# Patient Record
Sex: FEMALE | Race: WHITE | Employment: PART TIME | URBAN - METROPOLITAN AREA
[De-identification: names, ages, dates, MRNs, and addresses within clinical notes are randomized per-mention and may not be internally consistent; named-entity substitution may affect disease eponyms.]

---

## 2018-03-12 ENCOUNTER — TELEPHONE (OUTPATIENT)
Dept: FAMILY MEDICINE CLINIC | Facility: CLINIC | Age: 48
End: 2018-03-12

## 2018-03-12 ENCOUNTER — OFFICE VISIT (OUTPATIENT)
Dept: FAMILY MEDICINE CLINIC | Facility: CLINIC | Age: 48
End: 2018-03-12

## 2018-03-12 VITALS
SYSTOLIC BLOOD PRESSURE: 102 MMHG | HEIGHT: 62 IN | WEIGHT: 122 LBS | TEMPERATURE: 98 F | BODY MASS INDEX: 22.45 KG/M2 | RESPIRATION RATE: 16 BRPM | HEART RATE: 78 BPM | DIASTOLIC BLOOD PRESSURE: 68 MMHG | OXYGEN SATURATION: 99 %

## 2018-03-12 DIAGNOSIS — R07.89 ANTERIOR CHEST WALL PAIN: Primary | ICD-10-CM

## 2018-03-12 DIAGNOSIS — R07.89 COSTOCHONDRAL CHEST PAIN: ICD-10-CM

## 2018-03-12 PROCEDURE — 99213 OFFICE O/P EST LOW 20 MIN: CPT | Performed by: PHYSICIAN ASSISTANT

## 2018-03-12 NOTE — PROGRESS NOTES
CC:  Patient presents with:  Chest Pain      HPI: Elaina Presumalicia presents for complaints of anterior chest wall pain just left of the sternum since yesterday.  She accidentally inhaled a sip of water and the coughing fit that followed caused a sharp pin in her an atraumatic  Ears: Normal external ears  Nose: No bleeding or rhinorrhea  Eyes: Pupils equal  Cardio: RRR; no murmurs noted  Pulmonary/Chest: Lungs CTA  Abdomen: No distention  Musculoskeletal:  There is TTP over the sternum and anterior ribs on the left si

## 2018-03-12 NOTE — TELEPHONE ENCOUNTER
C/o Lt sided chest pain started yesterday pain with Deep breath no pain with movement painful with palpation appointment made with Mary Cadena PA-c for today

## 2018-10-05 ENCOUNTER — HOSPITAL ENCOUNTER (OUTPATIENT)
Dept: GENERAL RADIOLOGY | Facility: HOSPITAL | Age: 48
Discharge: HOME | End: 2018-10-05
Attending: INTERNAL MEDICINE
Payer: COMMERCIAL

## 2018-10-05 DIAGNOSIS — R13.12 OROPHARYNGEAL DYSPHAGIA: ICD-10-CM

## 2018-10-05 PROCEDURE — 74246 X-RAY XM UPR GI TRC 2CNTRST: CPT | Performed by: INTERNAL MEDICINE

## 2018-10-15 ENCOUNTER — HOSPITAL ENCOUNTER (OUTPATIENT)
Dept: CT IMAGING | Facility: HOSPITAL | Age: 48
Discharge: HOME | End: 2018-10-15
Attending: INTERNAL MEDICINE
Payer: COMMERCIAL

## 2018-10-15 DIAGNOSIS — R93.3 ABNORMAL ESOPHAGRAM: ICD-10-CM

## 2018-10-15 PROCEDURE — 70491 CT SOFT TISSUE NECK W/DYE: CPT | Performed by: INTERNAL MEDICINE

## 2018-10-15 PROCEDURE — 82565 ASSAY OF CREATININE: CPT

## 2018-11-08 PROBLEM — R12 HEARTBURN: Status: ACTIVE | Noted: 2018-11-08

## 2018-11-08 PROBLEM — Z12.11 SPECIAL SCREENING FOR MALIGNANT NEOPLASM OF COLON: Status: ACTIVE | Noted: 2018-11-08

## 2018-11-08 PROBLEM — R13.10 DYSPHAGIA: Status: ACTIVE | Noted: 2018-11-08

## 2018-11-29 LAB
AMB EXT CHOL/HDL RATIO: 2.5
AMB EXT CHOLESTEROL, TOTAL: 226 MG/DL
AMB EXT CREATININE: 0.6 MG/DL
AMB EXT GLUCOSE: 85 MG/DL
AMB EXT HDL CHOLESTEROL: 92 MG/DL
AMB EXT HEMATOCRIT: 37.6
AMB EXT HEMOGLOBIN: 12.5
AMB EXT HGBA1C: 5.4 %
AMB EXT LDL CHOLESTEROL, DIRECT: 124 MG/DL
AMB EXT MCV: 91
AMB EXT NON HDL CHOL: 134 MG/DL
AMB EXT PLATELETS: 219
AMB EXT TRIGLYCERIDES: 50 MG/DL
AMB EXT TSH: 1.04 MIU/ML
AMB EXT WBC: 6.3 X10(3)UL

## 2019-06-05 ENCOUNTER — OFFICE VISIT (OUTPATIENT)
Dept: INTERNAL MEDICINE CLINIC | Facility: CLINIC | Age: 49
End: 2019-06-05
Payer: COMMERCIAL

## 2019-06-05 VITALS
WEIGHT: 128.81 LBS | RESPIRATION RATE: 14 BRPM | OXYGEN SATURATION: 97 % | HEIGHT: 62 IN | SYSTOLIC BLOOD PRESSURE: 120 MMHG | HEART RATE: 77 BPM | BODY MASS INDEX: 23.7 KG/M2 | TEMPERATURE: 98 F | DIASTOLIC BLOOD PRESSURE: 72 MMHG

## 2019-06-05 DIAGNOSIS — Z13.89 SCREENING FOR GENITOURINARY CONDITION: ICD-10-CM

## 2019-06-05 DIAGNOSIS — Z13.228 SCREENING FOR METABOLIC DISORDER: ICD-10-CM

## 2019-06-05 DIAGNOSIS — Z13.0 SCREENING FOR IRON DEFICIENCY ANEMIA: ICD-10-CM

## 2019-06-05 DIAGNOSIS — Z13.1 SCREENING FOR DIABETES MELLITUS: ICD-10-CM

## 2019-06-05 DIAGNOSIS — E55.9 VITAMIN D DEFICIENCY: ICD-10-CM

## 2019-06-05 DIAGNOSIS — Z13.220 SCREENING FOR LIPOID DISORDERS: ICD-10-CM

## 2019-06-05 DIAGNOSIS — Z12.39 SCREENING FOR BREAST CANCER: ICD-10-CM

## 2019-06-05 DIAGNOSIS — Z00.00 ENCOUNTER FOR ANNUAL PHYSICAL EXAM: Primary | ICD-10-CM

## 2019-06-05 DIAGNOSIS — Z13.29 SCREENING FOR THYROID DISORDER: ICD-10-CM

## 2019-06-05 DIAGNOSIS — R13.12 OROPHARYNGEAL DYSPHAGIA: ICD-10-CM

## 2019-06-05 PROCEDURE — 99386 PREV VISIT NEW AGE 40-64: CPT | Performed by: STUDENT IN AN ORGANIZED HEALTH CARE EDUCATION/TRAINING PROGRAM

## 2019-06-05 PROCEDURE — 99202 OFFICE O/P NEW SF 15 MIN: CPT | Performed by: STUDENT IN AN ORGANIZED HEALTH CARE EDUCATION/TRAINING PROGRAM

## 2019-06-05 NOTE — PROGRESS NOTES
[facility]    REASON FOR VISIT:    Rukhsana Beavers is a 44year old female who presents for an [address]. This is a new patient to me.    Patient's medications, allergies, past medical, surgical, social and family histories wer 04/14/2001    Chlamydia Screening Screen Annually age<25, if sex active/on OCPs; >24 high risk No results found for: CHLAMYDIA    Colonoscopy Screen Every 10 years Colonoscopy due on 11/08/2028    Flex Sigmoidoscopy Screen  Every 5 years No results found f ENDOSCOPY,EXAM  01/01/2007      Family History   Problem Relation Age of Onset   • Alcohol and Other Disorders Associated Father    • Hypertension Father    • Ovarian Cancer Mother    • Other (Other) Mother         cervical cancer      SOCIAL HISTORY:   So and atraumatic. Nose: Nose normal.   Throat: posterior oropharyngeal erythema present. Eyes: EOM are normal. Pupils are equal, round, and reactive to light. No scleral icterus. Neck: Normal range of motion. No thyromegaly present.    Cardiovascular: No weekly. Health maintenance: will check fasting Lipids, CMP, CBC, TSH, UA and Vit D. Vaccinations: Tdap utd. FLU vaccination in the fall. Mammogram: due now. Ordered  PAP and breast exam: utd. Pt is utd with screening colonoscopy.   Pt info handouts give mammogram     Patient/Caregiver Education: There are no barriers to learning. Medical education done. Educated by: MD   The patient indicates understanding of these issues and agrees to the plan.     Diet counseling perfomed  Exercise counseling perfome

## 2019-06-05 NOTE — PATIENT INSTRUCTIONS
Prevention Guidelines, Women Ages 48 to 59  Screening tests and vaccines are an important part of managing your health. A screening test is done to find possible disorders or diseases in people who don't have any symptoms.  The goal is to find a disea Chlamydia Women at increased risk for infection At routine exams   Colorectal cancer All women in this age group Flexible sigmoidoscopy every 5 years, or colonoscopy every 10 years, or double-contrast barium enema every 5 years; yearly fecal occult blood t Hepatitis B Women at increased risk for infection – talk with your healthcare provider 3 doses over 6 months; second dose should be given 1 month after the first dose; the third dose should be given at least 2 months after the second dose and at least 4 mo Use of daily aspirin Women ages 54 and up in this age group who are at risk for cardiovascular health problems such as stroke When your risk is known   Use of tobacco and the health effects it can cause All women in this age group Every exam   [address]

## 2019-06-06 ENCOUNTER — TELEPHONE (OUTPATIENT)
Dept: INTERNAL MEDICINE CLINIC | Facility: CLINIC | Age: 49
End: 2019-06-06

## 2019-06-06 NOTE — TELEPHONE ENCOUNTER
Updated pap smears 12/19/18, 12/7/17, and 11/18/16. Switched to a 5 year due to clear paps. In Dr. Benson Neighbours incoming folder.

## 2019-06-06 NOTE — TELEPHONE ENCOUNTER
Incoming (mail or fax):  fax  Received from:  Dr Jayden Blanco's office  Documentation given to:  Claudia Miller

## 2019-06-11 ENCOUNTER — MED REC SCAN ONLY (OUTPATIENT)
Dept: INTERNAL MEDICINE CLINIC | Facility: CLINIC | Age: 49
End: 2019-06-11

## 2019-07-10 ENCOUNTER — TELEPHONE (OUTPATIENT)
Dept: INTERNAL MEDICINE CLINIC | Facility: CLINIC | Age: 49
End: 2019-07-10

## 2019-07-10 NOTE — TELEPHONE ENCOUNTER
Called and spoke with patient. Advised that results received from Dr. Deborah Jones office were Pap smear results. Patient states \"allergy pill\" helped intermittently, but she still has a dry mouth and has difficulty swallowing.  States she thought she had lab

## 2019-07-10 NOTE — TELEPHONE ENCOUNTER
Patient had an appt with Dr Kate Montgomery in June. PSR confirmed that our office received records from her previous doctor - Dr Ele Hernandez - that were requested at that time.   Patient was wondering if the records received included results testing for sjog

## 2019-07-15 ENCOUNTER — TELEPHONE (OUTPATIENT)
Dept: INTERNAL MEDICINE CLINIC | Facility: CLINIC | Age: 49
End: 2019-07-15

## 2019-07-15 NOTE — TELEPHONE ENCOUNTER
Incoming (mail or fax):fax  Received from:  [facility]  Documentation given to:  triage

## 2019-07-16 NOTE — TELEPHONE ENCOUNTER
Labs dated 11/30/18 received from Dr Catarina Gong (Ob/gyne). Entered in external results console as able. CBC, Vit D, Vit B12, thyroid, lipid, fsh, estradiol, LH, a1c    Routed to Dr Marj Solis.

## 2019-07-17 ENCOUNTER — MED REC SCAN ONLY (OUTPATIENT)
Dept: INTERNAL MEDICINE CLINIC | Facility: CLINIC | Age: 49
End: 2019-07-17

## 2019-08-03 ENCOUNTER — LAB ENCOUNTER (OUTPATIENT)
Dept: LAB | Facility: HOSPITAL | Age: 49
End: 2019-08-03
Attending: STUDENT IN AN ORGANIZED HEALTH CARE EDUCATION/TRAINING PROGRAM
Payer: COMMERCIAL

## 2019-08-03 DIAGNOSIS — E55.9 VITAMIN D DEFICIENCY: ICD-10-CM

## 2019-08-03 DIAGNOSIS — Z13.0 SCREENING FOR IRON DEFICIENCY ANEMIA: ICD-10-CM

## 2019-08-03 DIAGNOSIS — Z13.220 SCREENING FOR LIPOID DISORDERS: ICD-10-CM

## 2019-08-03 DIAGNOSIS — Z13.228 SCREENING FOR METABOLIC DISORDER: ICD-10-CM

## 2019-08-03 DIAGNOSIS — Z13.29 SCREENING FOR THYROID DISORDER: ICD-10-CM

## 2019-08-03 DIAGNOSIS — Z13.1 SCREENING FOR DIABETES MELLITUS: ICD-10-CM

## 2019-08-03 LAB
ALBUMIN SERPL-MCNC: 3.9 G/DL (ref 3.4–5)
ALBUMIN/GLOB SERPL: 1.1 {RATIO} (ref 1–2)
ALP LIVER SERPL-CCNC: 83 U/L (ref 41–108)
ALT SERPL-CCNC: 30 U/L (ref 13–56)
ANION GAP SERPL CALC-SCNC: 6 MMOL/L (ref 0–18)
AST SERPL-CCNC: 20 U/L (ref 15–37)
BASOPHILS # BLD AUTO: 0.04 X10(3) UL (ref 0–0.2)
BASOPHILS NFR BLD AUTO: 0.6 %
BILIRUB SERPL-MCNC: 0.7 MG/DL (ref 0.1–2)
BUN BLD-MCNC: 16 MG/DL (ref 7–18)
BUN/CREAT SERPL: 22.9 (ref 10–20)
CALCIUM BLD-MCNC: 8.9 MG/DL (ref 8.5–10.1)
CHLORIDE SERPL-SCNC: 105 MMOL/L (ref 98–112)
CHOLEST SMN-MCNC: 200 MG/DL (ref ?–200)
CO2 SERPL-SCNC: 30 MMOL/L (ref 21–32)
CREAT BLD-MCNC: 0.7 MG/DL (ref 0.55–1.02)
DEPRECATED RDW RBC AUTO: 40.5 FL (ref 35.1–46.3)
EOSINOPHIL # BLD AUTO: 0.17 X10(3) UL (ref 0–0.7)
EOSINOPHIL NFR BLD AUTO: 2.4 %
ERYTHROCYTE [DISTWIDTH] IN BLOOD BY AUTOMATED COUNT: 12.2 % (ref 11–15)
EST. AVERAGE GLUCOSE BLD GHB EST-MCNC: 111 MG/DL (ref 68–126)
GLOBULIN PLAS-MCNC: 3.7 G/DL (ref 2.8–4.4)
GLUCOSE BLD-MCNC: 94 MG/DL (ref 70–99)
HBA1C MFR BLD HPLC: 5.5 % (ref ?–5.7)
HCT VFR BLD AUTO: 37 % (ref 35–48)
HDLC SERPL-MCNC: 94 MG/DL (ref 40–59)
HGB BLD-MCNC: 12.3 G/DL (ref 12–16)
IMM GRANULOCYTES # BLD AUTO: 0.02 X10(3) UL (ref 0–1)
IMM GRANULOCYTES NFR BLD: 0.3 %
LDLC SERPL CALC-MCNC: 93 MG/DL (ref ?–100)
LYMPHOCYTES # BLD AUTO: 2.57 X10(3) UL (ref 1–4)
LYMPHOCYTES NFR BLD AUTO: 36.2 %
M PROTEIN MFR SERPL ELPH: 7.6 G/DL (ref 6.4–8.2)
MCH RBC QN AUTO: 30.1 PG (ref 26–34)
MCHC RBC AUTO-ENTMCNC: 33.2 G/DL (ref 31–37)
MCV RBC AUTO: 90.5 FL (ref 80–100)
MONOCYTES # BLD AUTO: 0.58 X10(3) UL (ref 0.1–1)
MONOCYTES NFR BLD AUTO: 8.2 %
NEUTROPHILS # BLD AUTO: 3.72 X10 (3) UL (ref 1.5–7.7)
NEUTROPHILS # BLD AUTO: 3.72 X10(3) UL (ref 1.5–7.7)
NEUTROPHILS NFR BLD AUTO: 52.3 %
NONHDLC SERPL-MCNC: 106 MG/DL (ref ?–130)
OSMOLALITY SERPL CALC.SUM OF ELEC: 293 MOSM/KG (ref 275–295)
PLATELET # BLD AUTO: 198 10(3)UL (ref 150–450)
POTASSIUM SERPL-SCNC: 4.3 MMOL/L (ref 3.5–5.1)
RBC # BLD AUTO: 4.09 X10(6)UL (ref 3.8–5.3)
SODIUM SERPL-SCNC: 141 MMOL/L (ref 136–145)
TRIGL SERPL-MCNC: 65 MG/DL (ref 30–149)
TSI SER-ACNC: 2.16 MIU/ML (ref 0.36–3.74)
VIT D+METAB SERPL-MCNC: 35.8 NG/ML (ref 30–100)
VLDLC SERPL CALC-MCNC: 13 MG/DL (ref 0–30)
WBC # BLD AUTO: 7.1 X10(3) UL (ref 4–11)

## 2019-08-03 PROCEDURE — 82306 VITAMIN D 25 HYDROXY: CPT

## 2019-08-03 PROCEDURE — 85025 COMPLETE CBC W/AUTO DIFF WBC: CPT

## 2019-08-03 PROCEDURE — 80061 LIPID PANEL: CPT

## 2019-08-03 PROCEDURE — 83036 HEMOGLOBIN GLYCOSYLATED A1C: CPT

## 2019-08-03 PROCEDURE — 80053 COMPREHEN METABOLIC PANEL: CPT

## 2019-08-03 PROCEDURE — 36415 COLL VENOUS BLD VENIPUNCTURE: CPT

## 2019-08-03 PROCEDURE — 84443 ASSAY THYROID STIM HORMONE: CPT

## 2019-08-07 ENCOUNTER — OFFICE VISIT (OUTPATIENT)
Dept: INTERNAL MEDICINE CLINIC | Facility: CLINIC | Age: 49
End: 2019-08-07
Payer: COMMERCIAL

## 2019-08-07 VITALS
OXYGEN SATURATION: 97 % | RESPIRATION RATE: 14 BRPM | HEART RATE: 71 BPM | WEIGHT: 128 LBS | BODY MASS INDEX: 23.55 KG/M2 | TEMPERATURE: 99 F | DIASTOLIC BLOOD PRESSURE: 62 MMHG | HEIGHT: 62 IN | SYSTOLIC BLOOD PRESSURE: 120 MMHG

## 2019-08-07 DIAGNOSIS — J30.2 SEASONAL ALLERGIC RHINITIS, UNSPECIFIED TRIGGER: Primary | ICD-10-CM

## 2019-08-07 PROBLEM — Z12.11 SPECIAL SCREENING FOR MALIGNANT NEOPLASM OF COLON: Status: RESOLVED | Noted: 2018-11-08 | Resolved: 2019-08-07

## 2019-08-07 PROCEDURE — 99213 OFFICE O/P EST LOW 20 MIN: CPT | Performed by: STUDENT IN AN ORGANIZED HEALTH CARE EDUCATION/TRAINING PROGRAM

## 2019-08-07 RX ORDER — FLUTICASONE PROPIONATE 50 MCG
1 SPRAY, SUSPENSION (ML) NASAL DAILY
Qty: 1 BOTTLE | Refills: 0 | Status: SHIPPED | OUTPATIENT
Start: 2019-08-07 | End: 2019-08-14

## 2019-08-07 RX ORDER — AZELASTINE 1 MG/ML
2 SPRAY, METERED NASAL 2 TIMES DAILY
Qty: 1 BOTTLE | Refills: 2 | Status: SHIPPED | OUTPATIENT
Start: 2019-08-07

## 2019-08-07 NOTE — PROGRESS NOTES
[facility]    REASON FOR VISIT:    Current Complaints: Patient presents with: Follow - Up: did bloodwork, very dry and scratchy throat       HPI:  Karan Noble is a 44year old female who presents for a f/u visit.     Here for a f/u visit for urgency, frequency and difficulty urinating. Musculoskeletal: Negative for arthralgias and gait problem. Skin: Negative for color change and rash.      EXAM:   /62 (BP Location: Left arm, Patient Position: Sitting, Cuff Size: adult)   Pulse 71 allergic rhinitis. No significant improvement on Claritin. Recommend to change antihistamine to Levocetirizine 5 mg or Allegra or Zyrtecy. Start Fonase Nasal Spray, one spray in each nostril qd and Azelastine nasal spray, use as directed.   Pt to return

## 2019-08-08 PROBLEM — J30.2 SEASONAL ALLERGIC RHINITIS: Status: ACTIVE | Noted: 2019-08-08

## 2019-08-15 ENCOUNTER — HOSPITAL ENCOUNTER (OUTPATIENT)
Dept: MAMMOGRAPHY | Facility: HOSPITAL | Age: 49
Discharge: HOME | End: 2019-08-15
Attending: PHYSICIAN ASSISTANT
Payer: COMMERCIAL

## 2019-08-15 DIAGNOSIS — Z12.39 SCREENING FOR BREAST CANCER: ICD-10-CM

## 2019-08-15 DIAGNOSIS — Z12.31 ENCOUNTER FOR SCREENING MAMMOGRAM FOR MALIGNANT NEOPLASM OF BREAST: ICD-10-CM

## 2019-08-15 PROCEDURE — 77067 SCR MAMMO BI INCL CAD: CPT | Performed by: PHYSICIAN ASSISTANT

## 2019-08-15 PROCEDURE — 77063 BREAST TOMOSYNTHESIS BI: CPT | Performed by: PHYSICIAN ASSISTANT

## 2019-11-01 ENCOUNTER — OFFICE VISIT (OUTPATIENT)
Dept: INTERNAL MEDICINE CLINIC | Facility: CLINIC | Age: 49
End: 2019-11-01
Payer: COMMERCIAL

## 2019-11-01 VITALS
RESPIRATION RATE: 14 BRPM | BODY MASS INDEX: 23.59 KG/M2 | HEIGHT: 62 IN | HEART RATE: 96 BPM | WEIGHT: 128.19 LBS | OXYGEN SATURATION: 98 % | SYSTOLIC BLOOD PRESSURE: 112 MMHG | TEMPERATURE: 99 F | DIASTOLIC BLOOD PRESSURE: 62 MMHG

## 2019-11-01 DIAGNOSIS — Z23 NEED FOR VACCINATION: ICD-10-CM

## 2019-11-01 DIAGNOSIS — M25.551 PAIN OF BOTH HIP JOINTS: ICD-10-CM

## 2019-11-01 DIAGNOSIS — M25.552 PAIN OF BOTH HIP JOINTS: ICD-10-CM

## 2019-11-01 DIAGNOSIS — M62.838 TRAPEZIUS MUSCLE SPASM: ICD-10-CM

## 2019-11-01 DIAGNOSIS — M25.50 ARTHRALGIA OF MULTIPLE JOINTS: ICD-10-CM

## 2019-11-01 DIAGNOSIS — M54.2 CERVICALGIA: Primary | ICD-10-CM

## 2019-11-01 PROCEDURE — 90471 IMMUNIZATION ADMIN: CPT | Performed by: STUDENT IN AN ORGANIZED HEALTH CARE EDUCATION/TRAINING PROGRAM

## 2019-11-01 PROCEDURE — 90686 IIV4 VACC NO PRSV 0.5 ML IM: CPT | Performed by: STUDENT IN AN ORGANIZED HEALTH CARE EDUCATION/TRAINING PROGRAM

## 2019-11-01 PROCEDURE — 99214 OFFICE O/P EST MOD 30 MIN: CPT | Performed by: STUDENT IN AN ORGANIZED HEALTH CARE EDUCATION/TRAINING PROGRAM

## 2019-11-01 RX ORDER — FLUTICASONE PROPIONATE 50 MCG
1 SPRAY, SUSPENSION (ML) NASAL DAILY
COMMUNITY

## 2019-11-01 RX ORDER — CYCLOBENZAPRINE HCL 5 MG
5 TABLET ORAL NIGHTLY
Qty: 30 TABLET | Refills: 0 | Status: SHIPPED | OUTPATIENT
Start: 2019-11-01 | End: 2021-03-10 | Stop reason: ALTCHOICE

## 2019-11-01 NOTE — PATIENT INSTRUCTIONS
Arthralgia    Arthralgia is the term for pain in or around the joint. It is a symptom, not a disease. This pain may involve one or more joints. In some cases, the pain moves from joint to joint. There are many causes for joint pain.  These include:  · In A muscle spasm is a sudden tightening of the muscle you can’t control. This may be caused by strain, overworking the muscle, or injury. It can also be caused by dehydration, electrolyte imbalance, diabetes, alcohol use, and certain medicines.  If it goes on

## 2019-11-01 NOTE — PROGRESS NOTES
HPI:    Patient ID: Vitaliy Sheikh is a 44year old female. Shoulder Pain    The pain is present in the neck and left shoulder. This is a chronic problem. Episode onset: for about 20 years, but in the past year it got worse.  There has been no histo Gastrointestinal: Negative. Negative for abdominal pain. Genitourinary: Negative. Musculoskeletal: Positive for arthralgias and neck pain. Negative for gait problem, gout, joint swelling, myalgias and neck stiffness. Skin: Negative.   Negative for i Psychiatric: She has a normal mood and affect. Her behavior is normal. Judgment and thought content normal.   Nursing note and vitals reviewed.              ASSESSMENT/PLAN:   Cervicalgia  (primary encounter diagnosis)  Trapezius muscle spasm  Cervicalgia i

## 2019-11-19 ENCOUNTER — APPOINTMENT (OUTPATIENT)
Dept: LAB | Facility: HOSPITAL | Age: 49
End: 2019-11-19
Attending: STUDENT IN AN ORGANIZED HEALTH CARE EDUCATION/TRAINING PROGRAM
Payer: COMMERCIAL

## 2019-11-19 ENCOUNTER — HOSPITAL ENCOUNTER (OUTPATIENT)
Dept: GENERAL RADIOLOGY | Facility: HOSPITAL | Age: 49
Discharge: HOME | End: 2019-11-19
Attending: STUDENT IN AN ORGANIZED HEALTH CARE EDUCATION/TRAINING PROGRAM
Payer: COMMERCIAL

## 2019-11-19 ENCOUNTER — ANCILLARY ORDERS (OUTPATIENT)
Dept: INTERNAL MEDICINE CLINIC | Facility: CLINIC | Age: 49
End: 2019-11-19

## 2019-11-19 DIAGNOSIS — M25.552 PAIN OF BOTH HIP JOINTS: ICD-10-CM

## 2019-11-19 DIAGNOSIS — M25.551 PAIN OF BOTH HIP JOINTS: ICD-10-CM

## 2019-11-19 DIAGNOSIS — M25.50 ARTHRALGIA OF MULTIPLE JOINTS: ICD-10-CM

## 2019-11-19 PROCEDURE — 86431 RHEUMATOID FACTOR QUANT: CPT

## 2019-11-19 PROCEDURE — 86038 ANTINUCLEAR ANTIBODIES: CPT

## 2019-11-19 PROCEDURE — 36415 COLL VENOUS BLD VENIPUNCTURE: CPT

## 2019-11-19 PROCEDURE — 85652 RBC SED RATE AUTOMATED: CPT

## 2019-11-19 PROCEDURE — 86140 C-REACTIVE PROTEIN: CPT

## 2019-11-19 PROCEDURE — 73523 X-RAY EXAM HIPS BI 5/> VIEWS: CPT | Performed by: STUDENT IN AN ORGANIZED HEALTH CARE EDUCATION/TRAINING PROGRAM

## 2019-11-21 DIAGNOSIS — M25.551 BILATERAL HIP PAIN: Primary | ICD-10-CM

## 2019-11-21 DIAGNOSIS — M25.552 BILATERAL HIP PAIN: Primary | ICD-10-CM

## 2020-01-09 ENCOUNTER — OFFICE VISIT (OUTPATIENT)
Dept: INTERNAL MEDICINE CLINIC | Facility: CLINIC | Age: 50
End: 2020-01-09
Payer: COMMERCIAL

## 2020-01-09 ENCOUNTER — HOSPITAL ENCOUNTER (OUTPATIENT)
Dept: GENERAL RADIOLOGY | Age: 50
Discharge: HOME | End: 2020-01-09
Attending: NURSE PRACTITIONER
Payer: COMMERCIAL

## 2020-01-09 VITALS
HEART RATE: 97 BPM | OXYGEN SATURATION: 98 % | DIASTOLIC BLOOD PRESSURE: 58 MMHG | HEIGHT: 62 IN | SYSTOLIC BLOOD PRESSURE: 106 MMHG | BODY MASS INDEX: 23.49 KG/M2 | WEIGHT: 127.63 LBS | RESPIRATION RATE: 16 BRPM | TEMPERATURE: 98 F

## 2020-01-09 DIAGNOSIS — J06.9 VIRAL URI WITH COUGH: ICD-10-CM

## 2020-01-09 DIAGNOSIS — R06.2 WHEEZING: ICD-10-CM

## 2020-01-09 DIAGNOSIS — R06.2 WHEEZING: Primary | ICD-10-CM

## 2020-01-09 LAB
CONTROL LINE PRESENT WITH A CLEAR BACKGROUND (YES/NO): YES YES/NO
KIT LOT #: NORMAL NUMERIC
STREP GRP A CUL-SCR: NEGATIVE

## 2020-01-09 PROCEDURE — 71046 X-RAY EXAM CHEST 2 VIEWS: CPT | Performed by: NURSE PRACTITIONER

## 2020-01-09 PROCEDURE — 87880 STREP A ASSAY W/OPTIC: CPT | Performed by: NURSE PRACTITIONER

## 2020-01-09 PROCEDURE — 99213 OFFICE O/P EST LOW 20 MIN: CPT | Performed by: NURSE PRACTITIONER

## 2020-01-09 PROCEDURE — 87081 CULTURE SCREEN ONLY: CPT | Performed by: NURSE PRACTITIONER

## 2020-01-09 RX ORDER — ALBUTEROL SULFATE 90 UG/1
2 AEROSOL, METERED RESPIRATORY (INHALATION) EVERY 6 HOURS PRN
Qty: 1 INHALER | Refills: 0 | Status: SHIPPED | OUTPATIENT
Start: 2020-01-09 | End: 2021-03-10 | Stop reason: ALTCHOICE

## 2020-01-09 RX ORDER — BENZONATATE 200 MG/1
200 CAPSULE ORAL 3 TIMES DAILY PRN
Qty: 21 CAPSULE | Refills: 0 | Status: SHIPPED | OUTPATIENT
Start: 2020-01-09 | End: 2021-03-10 | Stop reason: ALTCHOICE

## 2020-01-09 NOTE — PROGRESS NOTES
Candelaria Lara is a 44year old female.   CHIEF COMPLAINT   Left ear pressure, sore throat, cough, chest congestion    HPI:   The patient started to have a scratchy throat on Sunday followed by cough, chest congestion, feels like it is hard to take a d Tobacco Use      Smoking status: Passive Smoke Exposure - Never Smoker      Smokeless tobacco: Never Used    Alcohol use: Yes      Alcohol/week: 0.0 standard drinks      Comment: rarely 0-1 drinks wkly    Drug use: No       REVIEW OF SYSTEMS:   See HPI. LDL 93 08/03/2019    VLDL 13 08/03/2019    TCHDLRATIO 2.50 11/29/2018    NONHDLC 106 08/03/2019      Lab Results   Component Value Date    TSH 2.160 08/03/2019      Lab Results   Component Value Date     08/03/2019    A1C 5.5 08/03/2019        AS

## 2020-01-09 NOTE — PATIENT INSTRUCTIONS
Keep well hydrated     Use cepacol for sore throat and dry cough as needed. Use benzonotate and albuterol inhaler as directed     F/u in 1 week if symptoms dont improve or sooner if they worsen.      Viral Upper Respiratory Illness (Adult)    You have · Over-the-counter cold medicines will not shorten the length of time you’re sick, but they may be helpful for the following symptoms: cough, sore throat, and nasal and sinus congestion.  If you take prescription medicines, ask your healthcare provider or p

## 2020-07-08 ENCOUNTER — TELEPHONE (OUTPATIENT)
Dept: FAMILY MEDICINE CLINIC | Facility: CLINIC | Age: 50
End: 2020-07-08

## 2020-07-08 DIAGNOSIS — R05.9 COUGH: ICD-10-CM

## 2020-07-08 DIAGNOSIS — J02.9 SORE THROAT: ICD-10-CM

## 2020-07-08 DIAGNOSIS — Z20.822 SUSPECTED 2019 NOVEL CORONAVIRUS INFECTION: Primary | ICD-10-CM

## 2020-07-08 NOTE — TELEPHONE ENCOUNTER
Pt calling and reports ST and cough, works for [employer]. No known direct exposure but states that coworker recently came back from [place].

## 2020-07-09 ENCOUNTER — LAB ENCOUNTER (OUTPATIENT)
Dept: LAB | Facility: HOSPITAL | Age: 50
End: 2020-07-09
Attending: PHYSICIAN ASSISTANT
Payer: COMMERCIAL

## 2020-07-09 DIAGNOSIS — J02.9 SORE THROAT: ICD-10-CM

## 2020-07-09 DIAGNOSIS — Z20.822 SUSPECTED 2019 NOVEL CORONAVIRUS INFECTION: ICD-10-CM

## 2020-07-09 DIAGNOSIS — R05.9 COUGH: ICD-10-CM

## 2020-07-10 LAB — SARS-COV-2 RNA RESP QL NAA+PROBE: NOT DETECTED

## 2020-10-19 ENCOUNTER — HOSPITAL ENCOUNTER (OUTPATIENT)
Age: 50
Discharge: HOME | End: 2020-10-19
Payer: COMMERCIAL

## 2020-10-19 VITALS
WEIGHT: 126 LBS | DIASTOLIC BLOOD PRESSURE: 57 MMHG | HEART RATE: 78 BPM | TEMPERATURE: 99 F | OXYGEN SATURATION: 100 % | HEIGHT: 62.01 IN | SYSTOLIC BLOOD PRESSURE: 122 MMHG | RESPIRATION RATE: 14 BRPM | BODY MASS INDEX: 22.89 KG/M2

## 2020-10-19 DIAGNOSIS — B34.9 VIRAL SYNDROME: Primary | ICD-10-CM

## 2020-10-19 DIAGNOSIS — Z20.822 CLOSE EXPOSURE TO COVID-19 VIRUS: ICD-10-CM

## 2020-10-19 PROCEDURE — U0003 INFECTIOUS AGENT DETECTION BY NUCLEIC ACID (DNA OR RNA); SEVERE ACUTE RESPIRATORY SYNDROME CORONAVIRUS 2 (SARS-COV-2) (CORONAVIRUS DISEASE [COVID-19]), AMPLIFIED PROBE TECHNIQUE, MAKING USE OF HIGH THROUGHPUT TECHNOLOGIES AS DESCRIBED BY CMS-2020-01-R: HCPCS | Performed by: PHYSICIAN ASSISTANT

## 2020-10-19 PROCEDURE — 99213 OFFICE O/P EST LOW 20 MIN: CPT | Performed by: PHYSICIAN ASSISTANT

## 2020-10-19 NOTE — ED INITIAL ASSESSMENT (HPI)
Pt c/o nasal congestion and scratchy throat. Pt denies fever. Pt states a family member tested positive for covid . Pt was with the family member on Thursday.

## 2020-10-19 NOTE — ED PROVIDER NOTES
Patient Seen in: [facility]mediate [address]      History   Patient presents with:  Cough/URI    Stated Complaint: exposure, congestion, scrachy throat    HPI    CHIEF COMPLAINT: COVID-19 exposure    HISTORY OF PRESENT ILLNESS: Patient is a pleasant of Systems    Positive for stated complaint: exposure, congestion, scrachy throat  Other systems are as noted in HPI. Constitutional and vital signs reviewed. All other systems reviewed and negative except as noted above.     Physical Exam     ED Tria discharge and follow-up instructions were provided to help prevent relapse or worsening. I discussed the case with the patient and they had no questions, complaints, or concerns.   Patient states they understand diagnosis, followup plan and agree with and

## 2020-12-23 ENCOUNTER — MED REC SCAN ONLY (OUTPATIENT)
Dept: INTERNAL MEDICINE CLINIC | Facility: CLINIC | Age: 50
End: 2020-12-23

## 2021-02-24 ENCOUNTER — MED REC SCAN ONLY (OUTPATIENT)
Dept: INTERNAL MEDICINE CLINIC | Facility: CLINIC | Age: 51
End: 2021-02-24

## 2021-02-25 ENCOUNTER — TELEPHONE (OUTPATIENT)
Dept: INTERNAL MEDICINE CLINIC | Facility: CLINIC | Age: 51
End: 2021-02-25

## 2021-02-25 NOTE — TELEPHONE ENCOUNTER
Incoming (mail or fax):  fax  [facility]  Documentation given to: Dr. Patrice Enrique

## 2021-07-30 ENCOUNTER — LAB ENCOUNTER (OUTPATIENT)
Dept: LAB | Age: 51
End: 2021-07-30
Attending: NURSE PRACTITIONER
Payer: COMMERCIAL

## 2021-07-30 ENCOUNTER — OFFICE VISIT (OUTPATIENT)
Dept: INTERNAL MEDICINE CLINIC | Facility: CLINIC | Age: 51
End: 2021-07-30
Payer: COMMERCIAL

## 2021-07-30 VITALS
HEIGHT: 62.01 IN | DIASTOLIC BLOOD PRESSURE: 70 MMHG | WEIGHT: 117 LBS | BODY MASS INDEX: 21.26 KG/M2 | SYSTOLIC BLOOD PRESSURE: 128 MMHG | HEART RATE: 80 BPM | RESPIRATION RATE: 16 BRPM | OXYGEN SATURATION: 98 % | TEMPERATURE: 99 F

## 2021-07-30 DIAGNOSIS — R59.1 LYMPHADENOPATHY: Primary | ICD-10-CM

## 2021-07-30 DIAGNOSIS — R59.1 LYMPHADENOPATHY: ICD-10-CM

## 2021-07-30 PROBLEM — R13.10 DYSPHAGIA: Status: RESOLVED | Noted: 2018-11-08 | Resolved: 2021-07-30

## 2021-07-30 LAB
BASOPHILS # BLD AUTO: 0.05 X10(3) UL (ref 0–0.2)
BASOPHILS NFR BLD AUTO: 0.7 %
EOSINOPHIL # BLD AUTO: 0.14 X10(3) UL (ref 0–0.7)
EOSINOPHIL NFR BLD AUTO: 2 %
ERYTHROCYTE [DISTWIDTH] IN BLOOD BY AUTOMATED COUNT: 12.2 %
HCT VFR BLD AUTO: 40.4 %
HGB BLD-MCNC: 13.1 G/DL
IMM GRANULOCYTES # BLD AUTO: 0.01 X10(3) UL (ref 0–1)
IMM GRANULOCYTES NFR BLD: 0.1 %
LDH SERPL L TO P-CCNC: 171 U/L
LYMPHOCYTES # BLD AUTO: 2.26 X10(3) UL (ref 1–4)
LYMPHOCYTES NFR BLD AUTO: 33 %
MCH RBC QN AUTO: 30 PG (ref 26–34)
MCHC RBC AUTO-ENTMCNC: 32.4 G/DL (ref 31–37)
MCV RBC AUTO: 92.7 FL
MONOCYTES # BLD AUTO: 0.55 X10(3) UL (ref 0.1–1)
MONOCYTES NFR BLD AUTO: 8 %
NEUTROPHILS # BLD AUTO: 3.84 X10 (3) UL (ref 1.5–7.7)
NEUTROPHILS # BLD AUTO: 3.84 X10(3) UL (ref 1.5–7.7)
NEUTROPHILS NFR BLD AUTO: 56.2 %
PLATELET # BLD AUTO: 220 10(3)UL (ref 150–450)
RBC # BLD AUTO: 4.36 X10(6)UL
WBC # BLD AUTO: 6.9 X10(3) UL (ref 4–11)

## 2021-07-30 PROCEDURE — 85025 COMPLETE CBC W/AUTO DIFF WBC: CPT | Performed by: NURSE PRACTITIONER

## 2021-07-30 PROCEDURE — 3008F BODY MASS INDEX DOCD: CPT | Performed by: NURSE PRACTITIONER

## 2021-07-30 PROCEDURE — 3078F DIAST BP <80 MM HG: CPT | Performed by: NURSE PRACTITIONER

## 2021-07-30 PROCEDURE — 83615 LACTATE (LD) (LDH) ENZYME: CPT | Performed by: NURSE PRACTITIONER

## 2021-07-30 PROCEDURE — 99214 OFFICE O/P EST MOD 30 MIN: CPT | Performed by: NURSE PRACTITIONER

## 2021-07-30 PROCEDURE — 3074F SYST BP LT 130 MM HG: CPT | Performed by: NURSE PRACTITIONER

## 2021-07-30 NOTE — PROGRESS NOTES
HPI:    Patient ID: Lennox Lemmings is a 46year old female. Patient presents with:  Lump: painful lump behind left ear      Noted lump on left neck about 1 week ago. No viral symptoms. She had some weight loss with pandemic.  She is unsure about teresa Medications   Medication Sig Dispense Refill   • ibuprofen 100 MG/5ML Oral Suspension Take 200 mg by mouth every 4 (four) hours as needed for Fever. • Fluticasone Propionate 50 MCG/ACT Nasal Suspension 1 spray by Nasal route daily.      • Azelastine HCl submandibular, tonsillar, preauricular, posterior auricular or occipital adenopathy. Left side of head: Posterior auricular adenopathy present. No submental, submandibular, tonsillar, preauricular or occipital adenopathy.       Cervical: Cervical adeno

## 2021-08-04 ENCOUNTER — HOSPITAL ENCOUNTER (OUTPATIENT)
Dept: CT IMAGING | Age: 51
Discharge: HOME | End: 2021-08-04
Attending: NURSE PRACTITIONER
Payer: COMMERCIAL

## 2021-08-04 ENCOUNTER — TELEPHONE (OUTPATIENT)
Dept: INTERNAL MEDICINE CLINIC | Facility: CLINIC | Age: 51
End: 2021-08-04

## 2021-08-04 DIAGNOSIS — R59.1 LYMPHADENOPATHY: ICD-10-CM

## 2021-08-04 LAB — CREAT BLD-MCNC: 0.6 MG/DL

## 2021-08-04 PROCEDURE — 70491 CT SOFT TISSUE NECK W/DYE: CPT | Performed by: NURSE PRACTITIONER

## 2021-08-04 PROCEDURE — 82565 ASSAY OF CREATININE: CPT

## 2021-08-04 NOTE — TELEPHONE ENCOUNTER
Per radiology department, CT must be with contrast. Most recent [facility] 2019 shows normal creatinine, ok to proceed with recommended order. Order placed.

## 2021-09-28 ENCOUNTER — MED REC SCAN ONLY (OUTPATIENT)
Dept: INTERNAL MEDICINE CLINIC | Facility: CLINIC | Age: 51
End: 2021-09-28

## 2021-10-08 ENCOUNTER — TELEPHONE (OUTPATIENT)
Dept: INTERNAL MEDICINE CLINIC | Facility: CLINIC | Age: 51
End: 2021-10-08

## 2021-10-30 NOTE — TELEPHONE ENCOUNTER
D/w pt blood drawn on 8/4 was for her kidney function prior to her CT with contrast.  She expressed understanding.
Patient called in stating she would like to speak to RN for clarification as to what lab was drawn on 08/04/21 and reason for blood draw. Patient is confused since she also had labs 07/30/21 and is concerned she was charged twice.  Patient stated she spoke
Never smoker

## 2021-12-13 ENCOUNTER — MED REC SCAN ONLY (OUTPATIENT)
Dept: INTERNAL MEDICINE CLINIC | Facility: CLINIC | Age: 51
End: 2021-12-13

## 2021-12-14 PROBLEM — C44.41 BASAL CELL CARCINOMA (BCC) OF SKIN OF NECK: Status: ACTIVE | Noted: 2021-12-14

## 2022-04-27 ENCOUNTER — TELEPHONE (OUTPATIENT)
Dept: INTERNAL MEDICINE CLINIC | Facility: CLINIC | Age: 52
End: 2022-04-27

## 2022-05-09 ENCOUNTER — OFFICE VISIT (OUTPATIENT)
Dept: INTERNAL MEDICINE CLINIC | Facility: CLINIC | Age: 52
End: 2022-05-09
Payer: COMMERCIAL

## 2022-05-09 VITALS
WEIGHT: 118 LBS | BODY MASS INDEX: 21.71 KG/M2 | OXYGEN SATURATION: 98 % | SYSTOLIC BLOOD PRESSURE: 120 MMHG | DIASTOLIC BLOOD PRESSURE: 72 MMHG | HEIGHT: 62 IN | HEART RATE: 76 BPM | TEMPERATURE: 99 F | RESPIRATION RATE: 16 BRPM

## 2022-05-09 DIAGNOSIS — Z13.29 THYROID DISORDER SCREEN: ICD-10-CM

## 2022-05-09 DIAGNOSIS — M77.11 LATERAL EPICONDYLITIS OF RIGHT ELBOW: ICD-10-CM

## 2022-05-09 DIAGNOSIS — Z13.0 SCREENING, IRON DEFICIENCY ANEMIA: ICD-10-CM

## 2022-05-09 DIAGNOSIS — Z00.00 ANNUAL PHYSICAL EXAM: Primary | ICD-10-CM

## 2022-05-09 DIAGNOSIS — Z13.220 LIPID SCREENING: ICD-10-CM

## 2022-05-09 DIAGNOSIS — Z13.89 SCREENING FOR GENITOURINARY CONDITION: ICD-10-CM

## 2022-05-09 DIAGNOSIS — Z00.00 LABORATORY EXAMINATION ORDERED AS PART OF A ROUTINE GENERAL MEDICAL EXAMINATION: ICD-10-CM

## 2022-05-09 PROBLEM — C44.41 BASAL CELL CARCINOMA (BCC) OF SKIN OF NECK: Status: RESOLVED | Noted: 2021-12-14 | Resolved: 2022-05-09

## 2022-05-09 PROBLEM — Z85.828 HISTORY OF BASAL CELL CARCINOMA (BCC): Status: ACTIVE | Noted: 2022-05-09

## 2022-05-09 PROCEDURE — 3074F SYST BP LT 130 MM HG: CPT | Performed by: INTERNAL MEDICINE

## 2022-05-09 PROCEDURE — 99396 PREV VISIT EST AGE 40-64: CPT | Performed by: INTERNAL MEDICINE

## 2022-05-09 PROCEDURE — 3078F DIAST BP <80 MM HG: CPT | Performed by: INTERNAL MEDICINE

## 2022-05-09 PROCEDURE — 3008F BODY MASS INDEX DOCD: CPT | Performed by: INTERNAL MEDICINE

## 2022-05-25 ENCOUNTER — HOSPITAL ENCOUNTER (OUTPATIENT)
Dept: MAMMOGRAPHY | Age: 52
Discharge: HOME | End: 2022-05-25
Attending: NURSE PRACTITIONER
Payer: COMMERCIAL

## 2022-05-25 DIAGNOSIS — Z12.31 ENCOUNTER FOR SCREENING MAMMOGRAM FOR BREAST CANCER: ICD-10-CM

## 2022-05-25 PROCEDURE — 77063 BREAST TOMOSYNTHESIS BI: CPT | Performed by: NURSE PRACTITIONER

## 2022-05-25 PROCEDURE — 77067 SCR MAMMO BI INCL CAD: CPT | Performed by: NURSE PRACTITIONER

## 2022-06-23 ENCOUNTER — PATIENT MESSAGE (OUTPATIENT)
Dept: INTERNAL MEDICINE CLINIC | Facility: CLINIC | Age: 52
End: 2022-06-23

## 2022-06-23 DIAGNOSIS — M79.673 PAIN OF FOOT, UNSPECIFIED LATERALITY: Primary | ICD-10-CM

## 2022-06-23 DIAGNOSIS — L98.9 SKIN LESION: ICD-10-CM

## 2022-06-23 NOTE — TELEPHONE ENCOUNTER
From: Crispin Pickard  To: Aime Alfonso MD  Sent: 6/23/2022 9:36 AM CDT  Subject: REFERRALS    Called Monday to request referral for foot  very painful to walk have a stone like patch near ball of foot maybe a wart?    ALSO need a referral to dr PETTY [facility]  saw last year for skin cancer Need recheck I had Mohs surgery 2021 No complaints

## 2022-07-13 ENCOUNTER — OFFICE VISIT (OUTPATIENT)
Dept: PODIATRY CLINIC | Facility: CLINIC | Age: 52
End: 2022-07-13
Payer: COMMERCIAL

## 2022-07-13 DIAGNOSIS — M77.42 METATARSALGIA OF BOTH FEET: ICD-10-CM

## 2022-07-13 DIAGNOSIS — M77.41 METATARSALGIA OF BOTH FEET: ICD-10-CM

## 2022-07-13 DIAGNOSIS — M79.672 LEFT FOOT PAIN: ICD-10-CM

## 2022-07-13 DIAGNOSIS — Q82.8 POROKERATOSIS: Primary | ICD-10-CM

## 2022-07-13 PROCEDURE — 11055 PARING/CUTG B9 HYPRKER LES 1: CPT | Performed by: STUDENT IN AN ORGANIZED HEALTH CARE EDUCATION/TRAINING PROGRAM

## 2022-07-13 PROCEDURE — 99203 OFFICE O/P NEW LOW 30 MIN: CPT | Performed by: STUDENT IN AN ORGANIZED HEALTH CARE EDUCATION/TRAINING PROGRAM

## 2022-07-13 NOTE — PATIENT INSTRUCTIONS
- Use urea cream and pumice stone to manage site between visits. Follow-up as needed if porokeratosis recurs.

## 2022-12-12 ENCOUNTER — TELEPHONE (OUTPATIENT)
Dept: INTERNAL MEDICINE CLINIC | Facility: CLINIC | Age: 52
End: 2022-12-12

## 2022-12-12 NOTE — TELEPHONE ENCOUNTER
Neg covid late last night    Symptoms: scratchy throat, congestion, sore throat, low grade fever, slight achy feeling    Digital thermometer was running  on either side (red color)

## 2022-12-12 NOTE — TELEPHONE ENCOUNTER
Symptom onset: 12/11  Pt advised to continue OTC supportive care and retest for covid tomorrow. Once complete call office to schedule virtual visit. She expressed understanding.

## 2022-12-13 ENCOUNTER — VIRTUAL PHONE E/M (OUTPATIENT)
Dept: INTERNAL MEDICINE CLINIC | Facility: CLINIC | Age: 52
End: 2022-12-13
Payer: COMMERCIAL

## 2022-12-13 DIAGNOSIS — U07.1 COVID-19: Primary | ICD-10-CM

## 2022-12-13 PROCEDURE — 99442 PHONE E/M BY PHYS 11-20 MIN: CPT | Performed by: PHYSICIAN ASSISTANT

## 2022-12-13 NOTE — PROGRESS NOTES
Virtual Telephone Check-In    Robert Rodriges verbally consents to a Virtual/Telephone Check-In visit on 12/13/22. Patient has been referred to the [facility] website at [email or web address] to review the yearly Consent to Treat document. Patient understands and accepts financial responsibility for any deductible, co-insurance and/or co-pays associated with this service. Duration of the service: 15 minutes      Summary of topics discussed:   Tested positive for COVID today. Sore throat, fever up to 102, body aches, fatigue. Symptoms began 12/11. Better with dayquil, nyquil  Denies SOB, wheezing, confusion, chest pain. Review of Systems   Constitutional: Positive for chills, fever and malaise/fatigue. HENT: Positive for congestion and sore throat. Negative for ear pain and hearing loss. Respiratory: Positive for cough. Negative for hemoptysis, shortness of breath and wheezing. Cardiovascular: Negative for chest pain, palpitations and leg swelling. Musculoskeletal: Negative. Neurological: Negative. PE  No vitals for telephone visit  Patient alert and oriented via phone call, no evidence of respiratory distress or cough  Mood/affect normal        Diagnoses and all orders for this visit:    COVID-19  -     nirmatrelvir&ritonavir 300/100 20 x 150 MG & 10 x 100MG Oral Tablet Therapy Pack; Take two nirmatrelvir tablets (300mg) with one ritonavir tablet (100mg) together twice daily for 5 days. Discussed medication indications, risks, alternatives and side effects of Paxlovid. Reviewed CDC guidelines for COVID isolation. Continue supportive care.  Pt to contact us if new/worsening/persistent symptoms       Joanne Calero PA-C

## 2022-12-30 ENCOUNTER — TELEMEDICINE (OUTPATIENT)
Dept: INTERNAL MEDICINE CLINIC | Facility: CLINIC | Age: 52
End: 2022-12-30
Payer: COMMERCIAL

## 2022-12-30 DIAGNOSIS — R09.1: Primary | ICD-10-CM

## 2022-12-30 PROCEDURE — 99441 PHONE E/M BY PHYS 5-10 MIN: CPT | Performed by: NURSE PRACTITIONER

## 2023-11-06 ENCOUNTER — LAB REQUISITION (OUTPATIENT)
Dept: LAB | Facility: HOSPITAL | Age: 53
End: 2023-11-06
Payer: COMMERCIAL

## 2023-11-06 DIAGNOSIS — D48.5 NEOPLASM OF UNCERTAIN BEHAVIOR OF SKIN: ICD-10-CM

## 2023-11-06 PROCEDURE — 88305 TISSUE EXAM BY PATHOLOGIST: CPT | Performed by: DERMATOLOGY

## 2024-02-06 ENCOUNTER — TELEPHONE (OUTPATIENT)
Dept: INTERNAL MEDICINE CLINIC | Facility: CLINIC | Age: 54
End: 2024-02-06

## 2024-02-06 ENCOUNTER — HOSPITAL ENCOUNTER (OUTPATIENT)
Age: 54
Discharge: HOME | End: 2024-02-06
Payer: COMMERCIAL

## 2024-02-06 VITALS
DIASTOLIC BLOOD PRESSURE: 66 MMHG | BODY MASS INDEX: 21 KG/M2 | OXYGEN SATURATION: 98 % | WEIGHT: 116 LBS | SYSTOLIC BLOOD PRESSURE: 124 MMHG | RESPIRATION RATE: 18 BRPM | HEART RATE: 80 BPM | TEMPERATURE: 98 F

## 2024-02-06 DIAGNOSIS — H66.92 LEFT OTITIS MEDIA, UNSPECIFIED OTITIS MEDIA TYPE: Primary | ICD-10-CM

## 2024-02-06 PROCEDURE — 99213 OFFICE O/P EST LOW 20 MIN: CPT | Performed by: NURSE PRACTITIONER

## 2024-02-06 RX ORDER — AZITHROMYCIN 500 MG/1
500 TABLET, FILM COATED ORAL DAILY
Qty: 5 TABLET | Refills: 0 | Status: SHIPPED | OUTPATIENT
Start: 2024-02-06 | End: 2024-02-09 | Stop reason: ALTCHOICE

## 2024-02-06 NOTE — TELEPHONE ENCOUNTER
Last OV 5/2022  Pt needs to come in for appt to have her ears checked  PSR notified to contact pt to  schedule appt

## 2024-02-06 NOTE — ED INITIAL ASSESSMENT (HPI)
PT C/O LEFT EAR PAIN AND DRAINAGE FROM HER LEFT EAR. PT ALSO C/O HEADACHE, SCRATCHY THROAT AND A DRY COUGH.

## 2024-02-06 NOTE — TELEPHONE ENCOUNTER
Future Appointments   Date Time Provider Department Center   2/7/2024 11:45 AM Joanne Calero PA-C [facility] [address]

## 2024-02-06 NOTE — ED PROVIDER NOTES
Patient Seen in: [facility]    History     Chief Complaint   Patient presents with    Headache    Ear Problem Pain     Stated Complaint: Ear problem    HPI    Patient here today with  with c/o bilateral  ear pain, left >right  Pt has no  fever, mild  congestion, no cough.  No rash.   No difficulty breathing. There are not alleviating or mitigating factors.  Patient is tolerating p.o. fluids without difficulty.      Past Medical History:   Diagnosis Date    Bad breath     Belching     Dizziness     Hoarseness, chronic     Indigestion     Mouth sores     Nausea     Painful swallowing     Problems with swallowing     Sleep disturbance     Sputum production     Stress     Uncomfortable fullness after meals     Vomiting        Past Surgical History:   Procedure Laterality Date    D & C  01/01/2003    EGD      UPPER GI ENDOSCOPY,EXAM  01/01/2007            Family History   Problem Relation Age of Onset    Alcohol and Other Disorders Associated Father     Hypertension Father     Ovarian Cancer Mother     Other (Other) Mother         cervical cancer       Social History     Socioeconomic History    Marital status:    Tobacco Use    Smoking status: Passive Smoke Exposure - Never Smoker    Smokeless tobacco: Never   Vaping Use    Vaping Use: Never used   Substance and Sexual Activity    Alcohol use: Yes     Alcohol/week: 0.0 standard drinks of alcohol     Comment: rarely 0-1 drinks wkly    Drug use: No   Other Topics Concern    Caffeine Concern Yes     Comment: 1cup of coffee daily    Exercise No       Review of Systems    Positive for stated complaint: Ear problem  Other systems are as noted in HPI.  Constitutional and vital signs reviewed.      All other systems reviewed and negative except as noted above.    PSFH elements reviewed from today and agreed except as otherwise stated in HPI.    Physical Exam     ED Triage Vitals [02/06/24 1545]   /66   Pulse 80   Resp 18   Temp 98 °F (36.7  °C)   Temp src Temporal   SpO2 98 %   O2 Device None (Room air)       Current:/66   Pulse 80   Temp 98 °F (36.7 °C) (Temporal)   Resp 18   Wt 52.6 kg   SpO2 98%   BMI 21.22 kg/m²       GENERAL: well developed, well nourished, well hydrated, no distress  EARS: left  tm injected, dull, no perforation,  right tm is wnl  NOSE: nasal turbinates boggy  THROAT: There is no posterior oropharynx injection, edema, tonsillar asymmetry.  No uvular edema or deviation.  Patient is tolerating their own secretions.  LUNGS: no resp distress, increased upper airway sounds, clear at the bases  SKIN: good skin turgor, no obvious rashes  NECK: supple, no adenopathy, no thyromegaly  CARDIO: RRR without murmur  EXTREMITIES: no cyanosis, clubbing or edema  GI: soft, non-tender, normal bowel sounds  HEAD: normocephalic, atraumatic  EYES: sclera non icteric bilateral, conjunctiva clear      ED Course   Labs Reviewed - No data to display      I have personally  reviewed available prior medical records for any recent pertinent discharge summaries/testing. Patient/family updated on results and plan, a verbalized understanding and agreement with the plan.  I explained to the patient that emergent conditions may arise and to go to the ER for new, worsening or any persistent conditions. I've explained the importance of taking all medicatons as prescribed, follow up, and return precuations,  All questions answered.    MDM     Patient presents with earache. History and physical exam as above.  No fever, nontoxic appearing.  Does not meet SIRS criteria.  Oxygen saturation within normal limits for this patient.  No sore throat or difficulty swallowing, no trismus, unilateral tonsillar deviation or uvula swelling.  Presentation consistent with acute otitis media.  Prescription given for azithromycin due to pcn allergy.    Recommend that family continue with supportive care and initiate antibiotics if there is no improvement after 24-48  hours.  Recommend follow-up with PCP.  Counseled on supportive care including Tylenol.  Return to ED precautions discussed with the family.         Disposition and Plan     Clinical Impression:  1. Left otitis media, unspecified otitis media type        Disposition:  Discharge    Follow-up:  Tristan Valencia MD  [address] [place] [id number]  [phone]            Medications Prescribed:  Current Discharge Medication List        START taking these medications    Details   azithromycin 500 MG Oral Tab Take 1 tablet (500 mg total) by mouth daily for 5 days.  Qty: 5 tablet, Refills: 0

## 2024-02-06 NOTE — TELEPHONE ENCOUNTER
Sore throat, left ear draining fluid     Covid neg     Offered appt for tomorrow but pt unavailable    Please advise

## 2024-02-09 ENCOUNTER — OFFICE VISIT (OUTPATIENT)
Dept: INTERNAL MEDICINE CLINIC | Facility: CLINIC | Age: 54
End: 2024-02-09
Payer: COMMERCIAL

## 2024-02-09 ENCOUNTER — TELEPHONE (OUTPATIENT)
Dept: INTERNAL MEDICINE CLINIC | Facility: CLINIC | Age: 54
End: 2024-02-09

## 2024-02-09 VITALS
HEART RATE: 89 BPM | DIASTOLIC BLOOD PRESSURE: 72 MMHG | OXYGEN SATURATION: 99 % | WEIGHT: 117 LBS | BODY MASS INDEX: 21.53 KG/M2 | SYSTOLIC BLOOD PRESSURE: 126 MMHG | TEMPERATURE: 97 F | RESPIRATION RATE: 16 BRPM | HEIGHT: 62 IN

## 2024-02-09 DIAGNOSIS — J01.00 ACUTE NON-RECURRENT MAXILLARY SINUSITIS: Primary | ICD-10-CM

## 2024-02-09 DIAGNOSIS — H66.012 NON-RECURRENT ACUTE SUPPURATIVE OTITIS MEDIA OF LEFT EAR WITH SPONTANEOUS RUPTURE OF TYMPANIC MEMBRANE: ICD-10-CM

## 2024-02-09 DIAGNOSIS — J35.8 TONSILLITH: ICD-10-CM

## 2024-02-09 PROCEDURE — 3078F DIAST BP <80 MM HG: CPT | Performed by: PHYSICIAN ASSISTANT

## 2024-02-09 PROCEDURE — 99214 OFFICE O/P EST MOD 30 MIN: CPT | Performed by: PHYSICIAN ASSISTANT

## 2024-02-09 PROCEDURE — 3074F SYST BP LT 130 MM HG: CPT | Performed by: PHYSICIAN ASSISTANT

## 2024-02-09 PROCEDURE — 3008F BODY MASS INDEX DOCD: CPT | Performed by: PHYSICIAN ASSISTANT

## 2024-02-09 RX ORDER — OFLOXACIN 3 MG/ML
1 SOLUTION/ DROPS OPHTHALMIC 4 TIMES DAILY
Qty: 5 ML | Refills: 0 | Status: SHIPPED | OUTPATIENT
Start: 2024-02-09 | End: 2024-02-09

## 2024-02-09 RX ORDER — OFLOXACIN 3 MG/ML
5 SOLUTION AURICULAR (OTIC) DAILY
Qty: 5 ML | Refills: 0 | Status: SHIPPED | OUTPATIENT
Start: 2024-02-09 | End: 2024-02-16

## 2024-02-09 RX ORDER — DOXYCYCLINE HYCLATE 100 MG/1
100 CAPSULE ORAL 2 TIMES DAILY
Qty: 20 CAPSULE | Refills: 0 | Status: SHIPPED | OUTPATIENT
Start: 2024-02-09

## 2024-02-09 RX ORDER — PREDNISONE 20 MG/1
40 TABLET ORAL DAILY
Qty: 14 TABLET | Refills: 0 | Status: SHIPPED | OUTPATIENT
Start: 2024-02-09 | End: 2024-02-16

## 2024-02-09 NOTE — TELEPHONE ENCOUNTER
Last VV 12/30/22. Last in office visit 5/9/22. Recommend being seen in office. Patient hung up on PSR when offering in office appointment times.

## 2024-02-09 NOTE — PROGRESS NOTES
Lorie Chaudhary is a 53 year old female.  HPI:   X 7 days scratchy throat, sinus pressure headache, then ear pain, left side  L ear started to drain so she went to urgent care.   Started zpak, tomorrow is last day. Dayquil, nyquil not helping  Current Outpatient Medications   Medication Sig Dispense Refill   • doxycycline 100 MG Oral Cap Take 1 capsule (100 mg total) by mouth 2 (two) times daily. 20 capsule 0   • predniSONE 20 MG Oral Tab Take 2 tablets (40 mg total) by mouth daily for 7 days. 14 tablet 0   • ofloxacin 0.3 % Ophthalmic Solution Place 1 drop into the left eye 4 (four) times daily. 5 mL 0   • azithromycin 500 MG Oral Tab Take 1 tablet (500 mg total) by mouth daily for 5 days. 5 tablet 0      Past Medical History:   Diagnosis Date   • Bad breath    • Belching    • Dizziness    • Hoarseness, chronic    • Indigestion    • Mouth sores    • Nausea    • Painful swallowing    • Problems with swallowing    • Sleep disturbance    • Sputum production    • Stress    • Uncomfortable fullness after meals    • Vomiting       Social History:  Social History     Socioeconomic History   • Marital status:    Tobacco Use   • Smoking status: Never     Passive exposure: Yes   • Smokeless tobacco: Never   Vaping Use   • Vaping Use: Never used   Substance and Sexual Activity   • Alcohol use: Yes     Alcohol/week: 0.0 standard drinks of alcohol     Comment: rarely 0-1 drinks wkly   • Drug use: No   Other Topics Concern   • Caffeine Concern Yes     Comment: 1cup of coffee daily   • Exercise No        REVIEW OF SYSTEMS:   GENERAL HEALTH: feels well otherwise. Denies fever, chills, unintentional weight change  SKIN: denies any unusual skin lesions or rashes  RESPIRATORY: denies shortness of breath with exertion, denies cough or wheezing  CARDIOVASCULAR: denies chest pain or palpitations, denies leg swelling  GI: denies abdominal pain and denies heartburn. Denies nausea, vomiting, diarrhea, constipation  NEURO: denies  headaches, dizziness, weakness, syncope    EXAM:   /72   Pulse 89   Temp 97.2 °F (36.2 °C)   Resp 16   Ht 5' 2\" (1.575 m)   Wt 117 lb (53.1 kg)   SpO2 99%   BMI 21.40 kg/m²   GENERAL: well developed, well nourished,in no apparent distress  SKIN: no rashes,no suspicious lesions, warm and dry  HEENT: atraumatic, normocephalic, right TM normal. Left TM erythematous, + small perforation at 12:00, no drainage in canal.  Oropharynx: left tonsil increased pitting compared to right. No tonsillar hypertrophy. Voice is hoarse.  NECK: supple,no adenopathy, no thyromegaly  LUNGS: clear to auscultation b/l no W/R/R  CARDIO: RRR without murmur   EXTREMITIES: no cyanosis, clubbing or edema  NEURO: a/ox3, no focal deficits  PSYCH: mood and affect normal    ASSESSMENT AND PLAN:   1. Acute non-recurrent maxillary sinusitis (Primary)  2. Non-recurrent acute suppurative otitis media of left ear with spontaneous rupture of tympanic membrane  -     Doxycycline Hyclate; Take 1 capsule (100 mg total) by mouth 2 (two) times daily.  Dispense: 20 capsule; Refill: 0  -     predniSONE; Take 2 tablets (40 mg total) by mouth daily for 7 days.  Dispense: 14 tablet; Refill: 0  -     Ofloxacin; Place 1 drop into the left eye 4 (four) times daily.  Dispense: 5 mL; Refill: 0      3. Tonsilith   Pt dealing with chronic tonsil stones, reviewed supportive measures, pt will establish with ENT.  The patient indicates understanding of these issues and agrees to the plan.  Return in about 3 weeks (around 3/1/2024) for follow-up, routine physical.

## 2024-02-09 NOTE — TELEPHONE ENCOUNTER
Pt states that we sent her to [facility] and they give her meds and they dont work.   Pt was looking for an appt for today. PSR  offer Monday but PT denial saying \" you are not listening, I need an appt today \" I offer a TE to nurses but PT hang up     Unable to confirmed Ph number or symptoms

## 2024-02-12 ENCOUNTER — TELEPHONE (OUTPATIENT)
Facility: LOCATION | Age: 54
End: 2024-02-12

## 2024-02-12 NOTE — TELEPHONE ENCOUNTER
Pt called requesting to be seen sooner for an ear infection and perforation. Next available it march 13th and she states she is have a tooth extraction feb 26th and worried she will not be able to have it done because of her ear infection and perforation

## 2024-02-19 ENCOUNTER — OFFICE VISIT (OUTPATIENT)
Facility: LOCATION | Age: 54
End: 2024-02-19
Payer: COMMERCIAL

## 2024-02-19 DIAGNOSIS — H72.92 PERFORATION OF LEFT TYMPANIC MEMBRANE: Primary | ICD-10-CM

## 2024-02-19 DIAGNOSIS — H93.292 ABNORMAL AUDITORY PERCEPTION OF LEFT EAR: ICD-10-CM

## 2024-02-19 DIAGNOSIS — H92.02 LEFT EAR PAIN: ICD-10-CM

## 2024-02-19 DIAGNOSIS — H66.92 LEFT OTITIS MEDIA, UNSPECIFIED OTITIS MEDIA TYPE: ICD-10-CM

## 2024-02-19 PROCEDURE — 92567 TYMPANOMETRY: CPT | Performed by: AUDIOLOGIST

## 2024-02-19 PROCEDURE — 99204 OFFICE O/P NEW MOD 45 MIN: CPT | Performed by: OTOLARYNGOLOGY

## 2024-02-19 PROCEDURE — 92557 COMPREHENSIVE HEARING TEST: CPT | Performed by: AUDIOLOGIST

## 2024-02-19 NOTE — PROGRESS NOTES
Lorie Chaudhary is a 53 year old female.   Chief Complaint   Patient presents with    Ear Problem    Hearing Check     HPI:   53-year-old white female with history of recent cold that resulted in a severe left ear infection with a perforation that had chronic drainage ultimately to antibiotics later prednisone and eardrops seems to have solved the discharge still having a pressure sensation within the ear has a anticipated upcoming air flight in approximately a week.  Current Outpatient Medications   Medication Sig Dispense Refill    doxycycline 100 MG Oral Cap Take 1 capsule (100 mg total) by mouth 2 (two) times daily. 20 capsule 0      Past Medical History:   Diagnosis Date    Bad breath     Belching     Dizziness     Hoarseness, chronic     Indigestion     Mouth sores     Nausea     Painful swallowing     Problems with swallowing     Sleep disturbance     Sputum production     Stress     Uncomfortable fullness after meals     Vomiting       Social History:  Social History     Socioeconomic History    Marital status:    Tobacco Use    Smoking status: Never     Passive exposure: Yes    Smokeless tobacco: Never   Vaping Use    Vaping Use: Never used   Substance and Sexual Activity    Alcohol use: Yes     Alcohol/week: 0.0 standard drinks of alcohol     Comment: rarely 0-1 drinks wkly    Drug use: No   Other Topics Concern    Caffeine Concern Yes     Comment: 1cup of coffee daily    Exercise No      Past Surgical History:   Procedure Laterality Date    D & C  01/01/2003    EGD      UPPER GI ENDOSCOPY,EXAM  01/01/2007         REVIEW OF SYSTEMS:   GENERAL HEALTH: feels well otherwise  GENERAL : denies fever, chills, sweats, weight loss, weight gain  SKIN: denies any unusual skin lesions or rashes  RESPIRATORY: denies shortness of breath with exertion  NEURO: denies headaches    EXAM:   There were no vitals taken for this visit.    System Pertinent findings Details   Constitutional  Overall appearance -  Normal.   Psychiatric  Orientation - Oriented to time, place, person & situation. Appropriate mood and affect.   Head/Face  Facial features -- Normal. Skull - Normal.   Eyes  Pupils equal ,round ,react to light and accomidate   Ears  External Ear Right: Normal, Left: Normal. Canal - Right: Normal, Left: Normal. TM - Right: Intact TM no middle ear effusion left: Intact TM no middle ear effusion   Nose  External Nose, Normal, Septum -midline nasal Vault, clear,Turbinates - Right: Normal left: Normal   Mouth/Throat  Lips/teeth/gums - Normal. Tonsils -2+ cryptic with stones oropharynx - Normal.   Neck Exam  Inspection - Normal. Palpation - Normal. Parotid gland - Normal. Thyroid gland -normal   Neurological  Cranial nerves - Cranial nerves II through XII grossly intact.   Nasopharynx   Normal        Lymph Detail  Submental. Submandibular. Anterior cervical. Posterior cervical. Supraclavicular.   Audiogram shows mild high-frequency hearing loss with slight difference between left and right ear type a tympanograms left ear shallow    ASSESSMENT AND PLAN:   1. Perforation of left tympanic membrane  Some slight pressure issues recommending Flonase nasal spray.  Patient has upcoming dental surgery as well told her to use the Flonase throughout that issues having a removal of a tooth with implant left upper.  She already has Flonase at home.      The patient indicates understanding of these issues and agrees to the plan.      Kali Mueller MD  2/19/2024  4:04 PM

## 2024-02-19 NOTE — PROGRESS NOTES
Lorie Chaudhary was seen for an audiometric evaluation and tympanogram today. Referred back to physician.    Na Gabriel MS, CCC-A

## 2024-05-20 ENCOUNTER — TELEPHONE (OUTPATIENT)
Dept: INTERNAL MEDICINE CLINIC | Facility: CLINIC | Age: 54
End: 2024-05-20

## 2024-05-20 DIAGNOSIS — Z00.00 LABORATORY EXAMINATION ORDERED AS PART OF A ROUTINE GENERAL MEDICAL EXAMINATION: Primary | ICD-10-CM

## 2024-06-08 ENCOUNTER — HOSPITAL ENCOUNTER (OUTPATIENT)
Dept: MAMMOGRAPHY | Age: 54
Discharge: HOME | End: 2024-06-08
Attending: INTERNAL MEDICINE
Payer: COMMERCIAL

## 2024-06-08 DIAGNOSIS — Z12.31 ENCOUNTER FOR SCREENING MAMMOGRAM FOR MALIGNANT NEOPLASM OF BREAST: ICD-10-CM

## 2024-06-08 PROCEDURE — 77067 SCR MAMMO BI INCL CAD: CPT | Performed by: INTERNAL MEDICINE

## 2024-06-08 PROCEDURE — 77063 BREAST TOMOSYNTHESIS BI: CPT | Performed by: INTERNAL MEDICINE

## 2024-07-08 ENCOUNTER — PATIENT MESSAGE (OUTPATIENT)
Dept: INTERNAL MEDICINE CLINIC | Facility: CLINIC | Age: 54
End: 2024-07-08

## 2024-07-09 NOTE — TELEPHONE ENCOUNTER
From: Lorie Chaudhary  To: Tristan Valencia  Sent: 7/8/2024 11:57 PM CDT  Subject: July 19 Lab blood work     Do I need to Fast and for how long before appointment? Also does it matter when bloodwork is done relative to August 1 physical exam

## 2024-07-19 ENCOUNTER — LAB ENCOUNTER (OUTPATIENT)
Dept: LAB | Age: 54
End: 2024-07-19
Attending: INTERNAL MEDICINE
Payer: COMMERCIAL

## 2024-07-19 DIAGNOSIS — Z00.00 LABORATORY EXAMINATION ORDERED AS PART OF A ROUTINE GENERAL MEDICAL EXAMINATION: ICD-10-CM

## 2024-07-19 LAB
ALBUMIN SERPL-MCNC: 4.7 G/DL (ref 3.2–4.8)
ALBUMIN/GLOB SERPL: 1.6 {RATIO} (ref 1–2)
ALP LIVER SERPL-CCNC: 82 U/L
ALT SERPL-CCNC: 17 U/L
ANION GAP SERPL CALC-SCNC: 11 MMOL/L (ref 0–18)
AST SERPL-CCNC: 27 U/L (ref ?–34)
BASOPHILS # BLD AUTO: 0.04 X10(3) UL (ref 0–0.2)
BASOPHILS NFR BLD AUTO: 0.7 %
BILIRUB SERPL-MCNC: 0.9 MG/DL (ref 0.3–1.2)
BILIRUB UR QL STRIP.AUTO: NEGATIVE
BUN BLD-MCNC: 10 MG/DL (ref 9–23)
CALCIUM BLD-MCNC: 9.8 MG/DL (ref 8.7–10.4)
CHLORIDE SERPL-SCNC: 106 MMOL/L (ref 98–112)
CHOLEST SERPL-MCNC: 239 MG/DL (ref ?–200)
CLARITY UR REFRACT.AUTO: CLEAR
CO2 SERPL-SCNC: 24 MMOL/L (ref 21–32)
CREAT BLD-MCNC: 0.64 MG/DL
EGFRCR SERPLBLD CKD-EPI 2021: 104 ML/MIN/1.73M2 (ref 60–?)
EOSINOPHIL # BLD AUTO: 0.12 X10(3) UL (ref 0–0.7)
EOSINOPHIL NFR BLD AUTO: 2 %
ERYTHROCYTE [DISTWIDTH] IN BLOOD BY AUTOMATED COUNT: 12.4 %
FASTING PATIENT LIPID ANSWER: YES
FASTING STATUS PATIENT QL REPORTED: YES
GLOBULIN PLAS-MCNC: 3 G/DL (ref 2.8–4.4)
GLUCOSE BLD-MCNC: 81 MG/DL (ref 70–99)
GLUCOSE UR STRIP.AUTO-MCNC: NORMAL MG/DL
HCT VFR BLD AUTO: 37.5 %
HDLC SERPL-MCNC: 102 MG/DL (ref 40–59)
HGB BLD-MCNC: 12.5 G/DL
IMM GRANULOCYTES # BLD AUTO: 0.02 X10(3) UL (ref 0–1)
IMM GRANULOCYTES NFR BLD: 0.3 %
KETONES UR STRIP.AUTO-MCNC: NEGATIVE MG/DL
LDLC SERPL CALC-MCNC: 129 MG/DL (ref ?–100)
LEUKOCYTE ESTERASE UR QL STRIP.AUTO: NEGATIVE
LYMPHOCYTES # BLD AUTO: 2.41 X10(3) UL (ref 1–4)
LYMPHOCYTES NFR BLD AUTO: 40 %
MCH RBC QN AUTO: 30.3 PG (ref 26–34)
MCHC RBC AUTO-ENTMCNC: 33.3 G/DL (ref 31–37)
MCV RBC AUTO: 90.8 FL
MONOCYTES # BLD AUTO: 0.52 X10(3) UL (ref 0.1–1)
MONOCYTES NFR BLD AUTO: 8.6 %
NEUTROPHILS # BLD AUTO: 2.91 X10 (3) UL (ref 1.5–7.7)
NEUTROPHILS # BLD AUTO: 2.91 X10(3) UL (ref 1.5–7.7)
NEUTROPHILS NFR BLD AUTO: 48.4 %
NITRITE UR QL STRIP.AUTO: NEGATIVE
NONHDLC SERPL-MCNC: 137 MG/DL (ref ?–130)
OSMOLALITY SERPL CALC.SUM OF ELEC: 290 MOSM/KG (ref 275–295)
PH UR STRIP.AUTO: 6 [PH] (ref 5–8)
PLATELET # BLD AUTO: 223 10(3)UL (ref 150–450)
POTASSIUM SERPL-SCNC: 4.7 MMOL/L (ref 3.5–5.1)
PROT SERPL-MCNC: 7.7 G/DL (ref 5.7–8.2)
PROT UR STRIP.AUTO-MCNC: NEGATIVE MG/DL
RBC # BLD AUTO: 4.13 X10(6)UL
SODIUM SERPL-SCNC: 141 MMOL/L (ref 136–145)
SP GR UR STRIP.AUTO: 1.02 (ref 1–1.03)
TRIGL SERPL-MCNC: 46 MG/DL (ref 30–149)
TSI SER-ACNC: 1.7 MIU/ML (ref 0.55–4.78)
UROBILINOGEN UR STRIP.AUTO-MCNC: NORMAL MG/DL
VLDLC SERPL CALC-MCNC: 8 MG/DL (ref 0–30)
WBC # BLD AUTO: 6 X10(3) UL (ref 4–11)

## 2024-07-19 PROCEDURE — 84443 ASSAY THYROID STIM HORMONE: CPT

## 2024-07-19 PROCEDURE — 80061 LIPID PANEL: CPT

## 2024-07-19 PROCEDURE — 36415 COLL VENOUS BLD VENIPUNCTURE: CPT

## 2024-07-19 PROCEDURE — 85025 COMPLETE CBC W/AUTO DIFF WBC: CPT

## 2024-07-19 PROCEDURE — 81001 URINALYSIS AUTO W/SCOPE: CPT

## 2024-07-19 PROCEDURE — 80053 COMPREHEN METABOLIC PANEL: CPT

## 2024-08-01 ENCOUNTER — OFFICE VISIT (OUTPATIENT)
Dept: INTERNAL MEDICINE CLINIC | Facility: CLINIC | Age: 54
End: 2024-08-01
Payer: COMMERCIAL

## 2024-08-01 VITALS
OXYGEN SATURATION: 99 % | DIASTOLIC BLOOD PRESSURE: 70 MMHG | WEIGHT: 112 LBS | BODY MASS INDEX: 20.61 KG/M2 | SYSTOLIC BLOOD PRESSURE: 110 MMHG | TEMPERATURE: 98 F | HEART RATE: 78 BPM | RESPIRATION RATE: 16 BRPM | HEIGHT: 62 IN

## 2024-08-01 DIAGNOSIS — Z23 NEED FOR TDAP VACCINATION: ICD-10-CM

## 2024-08-01 DIAGNOSIS — Z00.00 ANNUAL PHYSICAL EXAM: Primary | ICD-10-CM

## 2024-08-01 PROBLEM — Z91.89 10 YEAR RISK OF MI OR STROKE < 7.5%: Status: ACTIVE | Noted: 2024-08-01

## 2024-08-01 PROBLEM — R12 HEARTBURN: Status: RESOLVED | Noted: 2018-11-08 | Resolved: 2024-08-01

## 2024-08-01 PROCEDURE — 3008F BODY MASS INDEX DOCD: CPT | Performed by: INTERNAL MEDICINE

## 2024-08-01 PROCEDURE — 99396 PREV VISIT EST AGE 40-64: CPT | Performed by: INTERNAL MEDICINE

## 2024-08-01 PROCEDURE — 3078F DIAST BP <80 MM HG: CPT | Performed by: INTERNAL MEDICINE

## 2024-08-01 PROCEDURE — 3074F SYST BP LT 130 MM HG: CPT | Performed by: INTERNAL MEDICINE

## 2024-08-01 PROCEDURE — 90715 TDAP VACCINE 7 YRS/> IM: CPT | Performed by: INTERNAL MEDICINE

## 2024-08-01 PROCEDURE — 90471 IMMUNIZATION ADMIN: CPT | Performed by: INTERNAL MEDICINE

## 2024-08-01 NOTE — PROGRESS NOTES
Subjective:   Patient ID: Lorie Chaudhary is a 54 year old female.    HPI  HPI:   Lorie Chaudhary is a 54 year old female who presents for a complete physical exam. Symptoms: is menopausal. Patient complains of nothing    PAST MEDICAL, SOCIAL, FAMILY HISTORIES REVIEWED WITH PT  .     Immunization History   Administered Date(s) Administered    >=3 YRS FLUZONE TRI PRESERV FREE SINGLE DOSE (20349) FLU [facility] 01/10/2014    >=3 YRS TRI  MULTIDOSE VIAL (95675) FLU CLINIC 11/21/2014    Covid-19 Vaccine Pfizer 30 mcg/0.3 ml 04/12/2021, 05/03/2021    FLULAVAL 6 months & older 0.5 ml Prefilled syringe (65770) 11/01/2019    TDAP 08/16/2012    Tb Intradermal Test 08/07/2012   Pended Date(s) Pended    TDAP 08/01/2024     Wt Readings from Last 6 Encounters:   08/01/24 112 lb (50.8 kg)   02/09/24 117 lb (53.1 kg)   02/06/24 116 lb (52.6 kg)   05/09/22 118 lb (53.5 kg)   07/30/21 117 lb (53.1 kg)   03/10/21 122 lb 9.6 oz (55.6 kg)     Body mass index is 20.49 kg/m².     Lab Results   Component Value Date    GLU 81 07/19/2024    GLU 94 08/03/2019    GLU 85 11/29/2018     Lab Results   Component Value Date    CHOLEST 239 (H) 07/19/2024    CHOLEST 200 (H) 08/03/2019    CHOLEST 226 11/29/2018     Lab Results   Component Value Date     (H) 07/19/2024    HDL 94 (H) 08/03/2019    HDL 92 11/29/2018     Lab Results   Component Value Date     (H) 07/19/2024    LDL 93 08/03/2019     Lab Results   Component Value Date    AST 27 07/19/2024    AST 20 08/03/2019     Lab Results   Component Value Date    ALT 17 07/19/2024    ALT 30 08/03/2019       No current outpatient medications on file.      Past Medical History:    Bad breath    Belching    Dizziness    Hoarseness, chronic    Indigestion    Mouth sores    Nausea    Painful swallowing    Problems with swallowing    Sleep disturbance    Sputum production    Stress    Uncomfortable fullness after meals    Vomiting      Past Surgical History:   Procedure Laterality Date    D  & c  01/01/2003    Egd      Upper gi endoscopy,exam  01/01/2007      Family History   Problem Relation Age of Onset    Alcohol and Other Disorders Associated Father     Hypertension Father     Ovarian Cancer Mother     Other (Other) Mother         cervical cancer      Social History:   Social History     Socioeconomic History    Marital status:    Tobacco Use    Smoking status: Never     Passive exposure: Yes    Smokeless tobacco: Never   Vaping Use    Vaping status: Never Used   Substance and Sexual Activity    Alcohol use: Yes     Alcohol/week: 0.0 standard drinks of alcohol     Comment: rarely 0-1 drinks wkly    Drug use: No   Other Topics Concern    Caffeine Concern Yes     Comment: 1cup of coffee daily    Exercise No     Occ: yes.   Exercise:  twice per week, three times per week.  Diet: watches fats closely and watches sugar closely     REVIEW OF SYSTEMS:   A comprehensive 10 point review of systems was completed.     Pertinent positives and negatives noted in the HPI.      EXAM:   /70   Pulse 78   Temp 98 °F (36.7 °C)   Resp 16   Ht 5' 2\" (1.575 m)   Wt 112 lb (50.8 kg)   SpO2 99%   BMI 20.49 kg/m²   Body mass index is 20.49 kg/m².   GENERAL: well developed, well nourished,in no apparent distress  SKIN: no rashes,no suspicious lesions  HEENT: atraumatic, normocephalic,ears and throat are clear  EYES:PERRLA, EOMI,,conjunctiva are clear  NECK: supple,no adenopathy,no bruits  LUNGS: clear to auscultation  CARDIO: RRR without murmur  GI: good BS's,no masses, HSM or tenderness  :deferred  MUSCULOSKELETAL: back is not tender  EXTREMITIES: no cyanosis, clubbing or edema  NEURO: Oriented times three,motor and sensory are grossly intact    ASSESSMENT AND PLAN:   Lorie Chaudhary is a 54 year old female who presents for a complete physical exam.  Pap and pelvic done by gyne  UTD with mammogram   Health maintenance, will check fasting Lipids, CMP, and CBC. UTD wIht screening colonoscopy.   Pt  info handouts given for: exercise, low fat diet a Body mass index is 20.49 kg/m²., recommended low fat diet and aerobic exercise 30 minutes three times weekly.    The patient indicates understanding of these issues and agrees to the plan.  The patient is asked to return for CPX in 12 m.    History/Other:   Review of Systems  No current outpatient medications on file.     Allergies:  Allergies   Allergen Reactions    Penicillins HIVES       Objective:   Physical Exam    Assessment & Plan:   1. Annual physical exam    2. Need for Tdap vaccination        Orders Placed This Encounter   Procedures    TdaP (Adacel, Boostrix) [32792]       Meds This Visit:  Requested Prescriptions      No prescriptions requested or ordered in this encounter       Imaging & Referrals:  TETANUS, DIPHTHERIA TOXOIDS AND ACELLULAR PERTUSIS VACCINE (TDAP), >7 YEARS, IM USE

## 2024-10-31 NOTE — PROGRESS NOTES
Lorie Chaudhary is a 54 year old female.  HPI:   HPI   Patient presents today with c/o noting left neck small nodule x 2 days. Denies night sweats, unintentional weight loss, fever.   Pt states she has a hx of intermittent enlarged lymph nodes. She performs lymph node massage daily.    Pt had similar c/o in 2021 where LDH lab was normal and CT soft neck showed a small lymph node enlargement.   No current outpatient medications on file.      Past Medical History:    Bad breath    Belching    Dizziness    Hoarseness, chronic    Indigestion    Mouth sores    Nausea    Painful swallowing    Problems with swallowing    Sleep disturbance    Sputum production    Stress    Uncomfortable fullness after meals    Vomiting      Social History:  Social History     Socioeconomic History    Marital status:    Tobacco Use    Smoking status: Never     Passive exposure: Yes    Smokeless tobacco: Never   Vaping Use    Vaping status: Never Used   Substance and Sexual Activity    Alcohol use: Yes     Alcohol/week: 0.0 standard drinks of alcohol     Comment: rarely 0-1 drinks wkly    Drug use: No   Other Topics Concern    Caffeine Concern Yes     Comment: 1cup of coffee daily    Exercise No        REVIEW OF SYSTEMS:   Review of Systems   Constitutional: Negative.    HENT: Negative.     Respiratory: Negative.     Cardiovascular: Negative.    Musculoskeletal:  Negative for neck pain and neck stiffness.   Skin: Negative.    Neurological: Negative.    Psychiatric/Behavioral: Negative.           EXAM:   /66   Pulse 103   Temp 97 °F (36.1 °C) (Temporal)   Resp 16   Ht 5' 2\" (1.575 m)   Wt 116 lb 12.8 oz (53 kg)   BMI 21.36 kg/m²   Physical Exam  Vitals and nursing note reviewed.   Constitutional:       Appearance: Normal appearance. She is normal weight.   HENT:      Head: Normocephalic.   Neck:        Comments: Size of rice grain hard nodule noted only when patient moves her head to the left.  Cardiovascular:      Rate  and Rhythm: Normal rate and regular rhythm.      Pulses: Normal pulses.      Heart sounds: Normal heart sounds.   Musculoskeletal:      Cervical back: Full passive range of motion without pain and normal range of motion.   Lymphadenopathy:      Cervical: No cervical adenopathy.      Upper Body:      Right upper body: No supraclavicular adenopathy.      Left upper body: No supraclavicular adenopathy.   Skin:     General: Skin is warm and dry.      Capillary Refill: Capillary refill takes less than 2 seconds.   Neurological:      General: No focal deficit present.      Mental Status: She is alert and oriented to person, place, and time. Mental status is at baseline.          ASSESSMENT AND PLAN:   Diagnoses and all orders for this visit:    Neck nodule   -monitor. Pt is aware if it persist or increase in size to contact office for possible imaging.   Screening for cervical cancer  -     [facility] [place] - In Network  Influenza vaccine refused  Pt will check insurance coverage for Shingrix vaccine.  Requested Prescriptions      No prescriptions requested or ordered in this encounter         The patient indicates understanding of these issues and agrees to the plan.  The patient is asked to return if symptoms persist or worsen.

## 2024-11-01 ENCOUNTER — OFFICE VISIT (OUTPATIENT)
Dept: INTERNAL MEDICINE CLINIC | Facility: CLINIC | Age: 54
End: 2024-11-01
Payer: COMMERCIAL

## 2024-11-01 VITALS
BODY MASS INDEX: 21.49 KG/M2 | RESPIRATION RATE: 16 BRPM | HEART RATE: 103 BPM | SYSTOLIC BLOOD PRESSURE: 124 MMHG | WEIGHT: 116.81 LBS | HEIGHT: 62 IN | TEMPERATURE: 97 F | DIASTOLIC BLOOD PRESSURE: 66 MMHG

## 2024-11-01 DIAGNOSIS — Z28.21 INFLUENZA VACCINE REFUSED: ICD-10-CM

## 2024-11-01 DIAGNOSIS — Z12.4 SCREENING FOR CERVICAL CANCER: ICD-10-CM

## 2024-11-01 DIAGNOSIS — R22.1 NECK NODULE: Primary | ICD-10-CM

## 2024-11-01 PROCEDURE — G2211 COMPLEX E/M VISIT ADD ON: HCPCS

## 2024-11-01 PROCEDURE — 99213 OFFICE O/P EST LOW 20 MIN: CPT

## 2024-11-01 PROCEDURE — 3074F SYST BP LT 130 MM HG: CPT

## 2024-11-01 PROCEDURE — 3008F BODY MASS INDEX DOCD: CPT

## 2024-11-01 PROCEDURE — 3078F DIAST BP <80 MM HG: CPT

## 2024-12-16 ENCOUNTER — LAB REQUISITION (OUTPATIENT)
Dept: LAB | Facility: HOSPITAL | Age: 54
End: 2024-12-16
Payer: COMMERCIAL

## 2024-12-16 DIAGNOSIS — D48.5 NEOPLASM OF UNCERTAIN BEHAVIOR OF SKIN: ICD-10-CM

## 2024-12-16 PROCEDURE — 88305 TISSUE EXAM BY PATHOLOGIST: CPT | Performed by: PLASTIC SURGERY

## 2025-05-09 ENCOUNTER — TELEPHONE (OUTPATIENT)
Dept: ADMINISTRATIVE | Age: 55
End: 2025-05-09

## 2025-05-09 DIAGNOSIS — L84 FOOT CALLUS: Primary | ICD-10-CM

## 2025-05-09 NOTE — TELEPHONE ENCOUNTER
Hello    Patient has sent a message to request a new referral to Podiatrist Dr. Ilya Valle for Foot Callus Removal.   If agreeable, please review and sign off on request.   Thank you   Elena

## 2025-05-12 ENCOUNTER — TELEPHONE (OUTPATIENT)
Dept: ADMINISTRATIVE | Age: 55
End: 2025-05-12

## 2025-05-12 DIAGNOSIS — Z85.828 PERSONAL HISTORY OF SKIN CANCER: Primary | ICD-10-CM

## 2025-05-12 DIAGNOSIS — C44.91 BASAL CELL CARCINOMA (BCC), UNSPECIFIED SITE: ICD-10-CM

## 2025-05-12 NOTE — TELEPHONE ENCOUNTER
Hello,    This patient has sent a message to request a new referral for an annual skin cancer check.    If agreeable, please review and sign off on request.   Thank you,  Elena

## 2025-06-03 ENCOUNTER — LAB ENCOUNTER (OUTPATIENT)
Dept: LAB | Age: 55
End: 2025-06-03
Attending: INTERNAL MEDICINE
Payer: COMMERCIAL

## 2025-06-03 DIAGNOSIS — R79.0 LOW IRON STORES: ICD-10-CM

## 2025-06-03 DIAGNOSIS — Z86.2 HISTORY OF ANEMIA: ICD-10-CM

## 2025-06-03 DIAGNOSIS — Z00.00 LABORATORY EXAMINATION ORDERED AS PART OF A ROUTINE GENERAL MEDICAL EXAMINATION: ICD-10-CM

## 2025-06-03 LAB
ALBUMIN SERPL-MCNC: 4.8 G/DL (ref 3.2–4.8)
ALBUMIN/GLOB SERPL: 1.7 {RATIO} (ref 1–2)
ALP LIVER SERPL-CCNC: 80 U/L (ref 46–118)
ALT SERPL-CCNC: 17 U/L (ref 10–49)
ANION GAP SERPL CALC-SCNC: 9 MMOL/L (ref 0–18)
AST SERPL-CCNC: 24 U/L (ref ?–34)
BASOPHILS # BLD AUTO: 0.07 X10(3) UL (ref 0–0.2)
BASOPHILS NFR BLD AUTO: 1.2 %
BILIRUB SERPL-MCNC: 0.9 MG/DL (ref 0.3–1.2)
BILIRUB UR QL STRIP.AUTO: NEGATIVE
BUN BLD-MCNC: 11 MG/DL (ref 9–23)
CALCIUM BLD-MCNC: 9.4 MG/DL (ref 8.7–10.6)
CHLORIDE SERPL-SCNC: 106 MMOL/L (ref 98–112)
CHOLEST SERPL-MCNC: 236 MG/DL (ref ?–200)
CLARITY UR REFRACT.AUTO: CLEAR
CO2 SERPL-SCNC: 28 MMOL/L (ref 21–32)
COLOR UR AUTO: COLORLESS
CREAT BLD-MCNC: 0.73 MG/DL (ref 0.55–1.02)
DEPRECATED HBV CORE AB SER IA-ACNC: 149 NG/ML (ref 50–306)
EGFRCR SERPLBLD CKD-EPI 2021: 96 ML/MIN/1.73M2 (ref 60–?)
EOSINOPHIL # BLD AUTO: 0.09 X10(3) UL (ref 0–0.7)
EOSINOPHIL NFR BLD AUTO: 1.5 %
ERYTHROCYTE [DISTWIDTH] IN BLOOD BY AUTOMATED COUNT: 12.7 %
FASTING PATIENT LIPID ANSWER: YES
FASTING STATUS PATIENT QL REPORTED: YES
GLOBULIN PLAS-MCNC: 2.8 G/DL (ref 2–3.5)
GLUCOSE BLD-MCNC: 92 MG/DL (ref 70–99)
GLUCOSE UR STRIP.AUTO-MCNC: NORMAL MG/DL
HCT VFR BLD AUTO: 38 % (ref 35–48)
HDLC SERPL-MCNC: 101 MG/DL (ref 40–59)
HGB BLD-MCNC: 12.5 G/DL (ref 12–16)
IMM GRANULOCYTES # BLD AUTO: 0.02 X10(3) UL (ref 0–1)
IMM GRANULOCYTES NFR BLD: 0.3 %
IRON SATN MFR SERPL: 38 % (ref 15–50)
IRON SERPL-MCNC: 112 UG/DL (ref 50–170)
KETONES UR STRIP.AUTO-MCNC: NEGATIVE MG/DL
LDLC SERPL CALC-MCNC: 124 MG/DL (ref ?–100)
LEUKOCYTE ESTERASE UR QL STRIP.AUTO: NEGATIVE
LYMPHOCYTES # BLD AUTO: 2.02 X10(3) UL (ref 1–4)
LYMPHOCYTES NFR BLD AUTO: 33.6 %
MCH RBC QN AUTO: 30.9 PG (ref 26–34)
MCHC RBC AUTO-ENTMCNC: 32.9 G/DL (ref 31–37)
MCV RBC AUTO: 93.8 FL (ref 80–100)
MONOCYTES # BLD AUTO: 0.4 X10(3) UL (ref 0.1–1)
MONOCYTES NFR BLD AUTO: 6.6 %
NEUTROPHILS # BLD AUTO: 3.42 X10 (3) UL (ref 1.5–7.7)
NEUTROPHILS # BLD AUTO: 3.42 X10(3) UL (ref 1.5–7.7)
NEUTROPHILS NFR BLD AUTO: 56.8 %
NITRITE UR QL STRIP.AUTO: NEGATIVE
NONHDLC SERPL-MCNC: 135 MG/DL (ref ?–130)
OSMOLALITY SERPL CALC.SUM OF ELEC: 295 MOSM/KG (ref 275–295)
PH UR STRIP.AUTO: 7 [PH] (ref 5–8)
PLATELET # BLD AUTO: 217 10(3)UL (ref 150–450)
POTASSIUM SERPL-SCNC: 4.5 MMOL/L (ref 3.5–5.1)
PROT SERPL-MCNC: 7.6 G/DL (ref 5.7–8.2)
PROT UR STRIP.AUTO-MCNC: NEGATIVE MG/DL
RBC # BLD AUTO: 4.05 X10(6)UL (ref 3.8–5.3)
RBC UR QL AUTO: NEGATIVE
SODIUM SERPL-SCNC: 143 MMOL/L (ref 136–145)
SP GR UR STRIP.AUTO: 1.01 (ref 1–1.03)
TOTAL IRON BINDING CAPACITY: 292 UG/DL (ref 250–425)
TRANSFERRIN SERPL-MCNC: 223 MG/DL (ref 250–380)
TRIGL SERPL-MCNC: 63 MG/DL (ref 30–149)
TSI SER-ACNC: 1.62 UIU/ML (ref 0.55–4.78)
UROBILINOGEN UR STRIP.AUTO-MCNC: NORMAL MG/DL
VLDLC SERPL CALC-MCNC: 11 MG/DL (ref 0–30)
WBC # BLD AUTO: 6 X10(3) UL (ref 4–11)

## 2025-06-03 PROCEDURE — 80053 COMPREHEN METABOLIC PANEL: CPT

## 2025-06-03 PROCEDURE — 83550 IRON BINDING TEST: CPT

## 2025-06-03 PROCEDURE — 85025 COMPLETE CBC W/AUTO DIFF WBC: CPT

## 2025-06-03 PROCEDURE — 80061 LIPID PANEL: CPT

## 2025-06-03 PROCEDURE — 82728 ASSAY OF FERRITIN: CPT

## 2025-06-03 PROCEDURE — 36415 COLL VENOUS BLD VENIPUNCTURE: CPT

## 2025-06-03 PROCEDURE — 84443 ASSAY THYROID STIM HORMONE: CPT

## 2025-06-03 PROCEDURE — 83540 ASSAY OF IRON: CPT

## 2025-06-03 PROCEDURE — 81003 URINALYSIS AUTO W/O SCOPE: CPT

## 2025-07-02 ENCOUNTER — OFFICE VISIT (OUTPATIENT)
Dept: PODIATRY CLINIC | Facility: CLINIC | Age: 55
End: 2025-07-02
Payer: COMMERCIAL

## 2025-07-02 DIAGNOSIS — M77.41 METATARSALGIA OF BOTH FEET: ICD-10-CM

## 2025-07-02 DIAGNOSIS — M77.42 METATARSALGIA OF BOTH FEET: ICD-10-CM

## 2025-07-02 DIAGNOSIS — Q82.8 POROKERATOSIS: ICD-10-CM

## 2025-07-02 DIAGNOSIS — M79.671 RIGHT FOOT PAIN: ICD-10-CM

## 2025-07-02 DIAGNOSIS — L84 FOOT CALLUS: Primary | ICD-10-CM

## 2025-07-02 DIAGNOSIS — M79.672 LEFT FOOT PAIN: ICD-10-CM

## 2025-07-06 NOTE — PROGRESS NOTES
[facility] Podiatry  Progress Note    Lorie Chaudhary is a 55 year old female.   Chief Complaint   Patient presents with    Callus     Bilateral feet- patient denies pain- the callus gets hard         HPI:     Patient is a pleasant 55-year-old female who presents to clinic for evaluation of lesion to her plantar left foot that causes pain. She also has similar lesion to her right foot.  She denies any recent trauma or injury. It has been several years since she was evaluated in our office. Past medical history, medications, and allergies were reviewed.      Allergies: Penicillins   No current outpatient medications on file.      Past Medical History:    Bad breath    Belching    Dizziness    Hoarseness, chronic    Indigestion    Mouth sores    Nausea    Painful swallowing    Problems with swallowing    Sleep disturbance    Sputum production    Stress    Uncomfortable fullness after meals    Vomiting      Past Surgical History:   Procedure Laterality Date    D & c  01/01/2003    Egd      Upper gi endoscopy,exam  01/01/2007      Family History   Problem Relation Age of Onset    Alcohol and Other Disorders Associated Father     Hypertension Father     Ovarian Cancer Mother     Other (Other) Mother         cervical cancer      Social History     Socioeconomic History    Marital status:    Tobacco Use    Smoking status: Never     Passive exposure: Yes    Smokeless tobacco: Never   Vaping Use    Vaping status: Never Used   Substance and Sexual Activity    Alcohol use: Yes     Alcohol/week: 0.0 standard drinks of alcohol     Comment: rarely 0-1 drinks wkly    Drug use: No   Other Topics Concern    Caffeine Concern Yes     Comment: 1cup of coffee daily    Exercise No           REVIEW OF SYSTEMS:     No n/v/f/c.      EXAM:   There were no vitals taken for this visit.  GENERAL: well developed, well nourished, in no apparent distress  EXTREMITIES:   1. Integument: Normal skin temperature and turgor.   Hyperkeratotic lesion with nucleated core noted subsecond metatarsal head of left foot and right foot.  2. Vascular: Dorsalis pedis two out of four bilateral and posterior tibial pulses two out of   four bilateral, capillary refill normal.   3. Musculoskeletal: All muscle groups are graded 5 out of 5 in the foot and ankle.  Pain on palpation noted to site of porokeratosis subsecond metatarsal head of left foot and right foot.  Medial deviation of first metatarsal lateral deviation of hallux consistent with mild bunion deformity bilaterally.   4. Neurological: Normal sharp dull sensation; reflexes normal.        ASSESSMENT AND PLAN:   Diagnoses and all orders for this visit:    Foot callus    Porokeratosis    Metatarsalgia of both feet    Left foot pain    Right foot pain          Plan:     -Patient examined, chart history reviewed.  -Discussed etiology of condition and various treatment options.  -Pared hyperkeratotic lesion x2 with #15 blade, successfully removing core, without incident.  Patient expressed relief in symptoms upon completion of procedure.  -Patient can use urea cream and pumice stone to help manage site between visits.  -Should ambulate with supportive shoe gear and avoid walking barefoot.  -All questions were answered to satisfaction.  She can follow-up as needed for routine debridement of site if it recurs moving forward.    The patient indicates understanding of these issues and agrees to the plan.      Ilya Valle DPM

## 2025-07-08 ENCOUNTER — TELEPHONE (OUTPATIENT)
Facility: CLINIC | Age: 55
End: 2025-07-08

## 2025-07-08 NOTE — TELEPHONE ENCOUNTER
Patient states she saw Dr. Valle on 7/2 and is still having pain on her foot and she may need him to take another look at it. Patient would like a nurse to call her. Please advise.

## 2025-07-08 NOTE — TELEPHONE ENCOUNTER
S/w patient- Patient was seen on 7/2/25 for bilateral foot callus care. Patient states that right foot is back to normal, but the left foot is still quite painful and has scab over the site. Patient states that some of the callus is still present and was hoping the Dr Block would be able to help remove. I offered opening on 7/11 with Dr Valle. She accepted.     SE

## 2025-07-11 ENCOUNTER — OFFICE VISIT (OUTPATIENT)
Dept: PODIATRY CLINIC | Facility: CLINIC | Age: 55
End: 2025-07-11

## 2025-07-11 DIAGNOSIS — Q82.8 POROKERATOSIS: ICD-10-CM

## 2025-07-11 DIAGNOSIS — M79.672 LEFT FOOT PAIN: ICD-10-CM

## 2025-07-11 DIAGNOSIS — L84 FOOT CALLUS: ICD-10-CM

## 2025-07-11 DIAGNOSIS — M77.42 METATARSALGIA OF BOTH FEET: Primary | ICD-10-CM

## 2025-07-11 DIAGNOSIS — M77.41 METATARSALGIA OF BOTH FEET: Primary | ICD-10-CM

## 2025-07-11 NOTE — PROGRESS NOTES
[facility] Podiatry  Progress Note    Lorie Chaudhary is a 55 year old female.   Chief Complaint   Patient presents with    Follow - Up     Left foot callus- rates pain 6/10-          HPI:     Patient is a pleasant 55-year-old female who presents to clinic for evaluation of lesion to her plantar left foot that causes pain. She also has similar lesion to her right foot.  She had these trimmed at last visit but her left foot callus is still causing some pain and rates it a 6 out of 10.  She presents today for evaluation.      Allergies: Penicillins   No current outpatient medications on file.      Past Medical History:    Bad breath    Belching    Dizziness    Hoarseness, chronic    Indigestion    Mouth sores    Nausea    Painful swallowing    Problems with swallowing    Sleep disturbance    Sputum production    Stress    Uncomfortable fullness after meals    Vomiting      Past Surgical History:   Procedure Laterality Date    D & c  01/01/2003    Egd      Upper gi endoscopy,exam  01/01/2007      Family History   Problem Relation Age of Onset    Alcohol and Other Disorders Associated Father     Hypertension Father     Ovarian Cancer Mother     Other (Other) Mother         cervical cancer      Social History     Socioeconomic History    Marital status:    Tobacco Use    Smoking status: Never     Passive exposure: Yes    Smokeless tobacco: Never   Vaping Use    Vaping status: Never Used   Substance and Sexual Activity    Alcohol use: Yes     Alcohol/week: 0.0 standard drinks of alcohol     Comment: rarely 0-1 drinks wkly    Drug use: No   Other Topics Concern    Caffeine Concern Yes     Comment: 1cup of coffee daily    Exercise No           REVIEW OF SYSTEMS:     No n/v/f/c.      EXAM:   There were no vitals taken for this visit.  GENERAL: well developed, well nourished, in no apparent distress  EXTREMITIES:   1. Integument: Normal skin temperature and turgor.  Hyperkeratotic lesion with nucleated core  noted subsecond metatarsal head of left foot. Still painful after last visit.  2. Vascular: Dorsalis pedis two out of four bilateral and posterior tibial pulses two out of   four bilateral, capillary refill normal.   3. Musculoskeletal: All muscle groups are graded 5 out of 5 in the foot and ankle.  Pain on palpation noted to site of porokeratosis subsecond metatarsal head of left foot and right foot.  Medial deviation of first metatarsal lateral deviation of hallux consistent with mild bunion deformity bilaterally.   4. Neurological: Normal sharp dull sensation; reflexes normal.        ASSESSMENT AND PLAN:   Diagnoses and all orders for this visit:    Metatarsalgia of both feet    Foot callus    Porokeratosis    Left foot pain          Plan:     -Patient examined, chart history reviewed.  -Discussed etiology of condition and various treatment options.  -Pared hyperkeratotic lesion x1 with #15 blade, successfully removing core, without incident.  Patient expressed relief in symptoms upon completion of procedure.  -Patient can use urea cream and pumice stone to help manage site between visits.  -Should ambulate with supportive shoe gear and avoid walking barefoot.  -All questions were answered to satisfaction.  She can follow-up as needed for routine debridement of site if it recurs moving forward.    The patient indicates understanding of these issues and agrees to the plan.      Ilya Valle DPM

## 2025-08-01 ENCOUNTER — OFFICE VISIT (OUTPATIENT)
Dept: INTERNAL MEDICINE CLINIC | Facility: CLINIC | Age: 55
End: 2025-08-01

## 2025-08-01 VITALS
DIASTOLIC BLOOD PRESSURE: 60 MMHG | TEMPERATURE: 98 F | RESPIRATION RATE: 18 BRPM | WEIGHT: 115 LBS | HEIGHT: 62 IN | SYSTOLIC BLOOD PRESSURE: 102 MMHG | HEART RATE: 72 BPM | OXYGEN SATURATION: 99 % | BODY MASS INDEX: 21.16 KG/M2

## 2025-08-01 DIAGNOSIS — Z12.31 ENCOUNTER FOR SCREENING MAMMOGRAM FOR MALIGNANT NEOPLASM OF BREAST: ICD-10-CM

## 2025-08-01 DIAGNOSIS — Z23 NEED FOR PNEUMOCOCCAL VACCINATION: ICD-10-CM

## 2025-08-01 DIAGNOSIS — M26.629 TMJ PAIN DYSFUNCTION SYNDROME: ICD-10-CM

## 2025-08-01 DIAGNOSIS — Z01.84 IMMUNITY STATUS TESTING: ICD-10-CM

## 2025-08-01 DIAGNOSIS — Z00.00 ANNUAL PHYSICAL EXAM: Primary | ICD-10-CM

## 2025-08-01 DIAGNOSIS — Z12.4 CERVICAL CANCER SCREENING: ICD-10-CM

## (undated) DIAGNOSIS — Z12.31 ENCOUNTER FOR SCREENING MAMMOGRAM FOR BREAST CANCER: Primary | ICD-10-CM

## (undated) NOTE — LETTER
07/05/19        Marcelina Medina  [address] [facility] [address] [facility]      Dear Elaina Armijo,    [address] [facility] records indicate that you have outstanding lab work and or testing that was ordered for you and has not yet been completed:  Orders Placed This Encounter

## (undated) NOTE — LETTER
08/05/19        Janine Born  [address] [facility]      Dear Pema Herndon,    [address] [facility] records indicate that you have outstanding lab work and or testing that was ordered for you and has not yet been completed:  Orders Placed This Encounter